# Patient Record
Sex: FEMALE | Race: WHITE | URBAN - METROPOLITAN AREA
[De-identification: names, ages, dates, MRNs, and addresses within clinical notes are randomized per-mention and may not be internally consistent; named-entity substitution may affect disease eponyms.]

---

## 2018-03-12 PROBLEM — C43.72 MALIGNANT MELANOMA OF LEFT LOWER EXTREMITY INCLUDING HIP (HCC): Status: ACTIVE | Noted: 2017-12-07

## 2019-01-16 PROBLEM — Z34.90 PREGNANCY: Status: ACTIVE | Noted: 2019-01-16

## 2019-01-16 PROBLEM — O43.129 VELAMENTOUS INSERTION OF UMBILICAL CORD: Status: ACTIVE | Noted: 2019-01-16

## 2019-01-16 PROBLEM — Z98.891 H/O CESAREAN SECTION: Status: ACTIVE | Noted: 2019-01-16

## 2019-01-16 PROBLEM — F12.91 HISTORY OF MARIJUANA USE: Status: ACTIVE | Noted: 2019-01-16

## 2019-04-10 ENCOUNTER — HOSPITAL ENCOUNTER (OUTPATIENT)
Age: 29
Setting detail: OBSERVATION
Discharge: HOME | End: 2019-04-12
Attending: OBSTETRICS & GYNECOLOGY | Admitting: OBSTETRICS & GYNECOLOGY
Payer: COMMERCIAL

## 2019-04-10 PROBLEM — K81.9 CHOLECYSTITIS, UNSPECIFIED: Status: ACTIVE | Noted: 2019-04-10

## 2019-04-10 LAB
ALBUMIN SERPL-MCNC: 3.1 G/DL (ref 3.5–5)
ALBUMIN/GLOB SERPL: 0.9 {RATIO}
ALP SERPL-CCNC: 206 U/L (ref 50–130)
ALT SERPL-CCNC: 130 U/L (ref 12–65)
ANION GAP SERPL CALC-SCNC: 7 MMOL/L
AST SERPL-CCNC: 106 U/L (ref 15–37)
BASOPHILS # BLD: 0 K/UL (ref 0–0.2)
BASOPHILS NFR BLD: 0 % (ref 0–2)
BILIRUB SERPL-MCNC: 1.5 MG/DL (ref 0.2–1.1)
BUN SERPL-MCNC: 4 MG/DL (ref 6–23)
CALCIUM SERPL-MCNC: 8.6 MG/DL (ref 8.3–10.4)
CHLORIDE SERPL-SCNC: 105 MMOL/L (ref 98–107)
CO2 SERPL-SCNC: 26 MMOL/L (ref 21–32)
CREAT SERPL-MCNC: 0.39 MG/DL (ref 0.6–1)
DIFFERENTIAL METHOD BLD: ABNORMAL
EOSINOPHIL # BLD: 0 K/UL (ref 0–0.8)
EOSINOPHIL NFR BLD: 0 % (ref 0.5–7.8)
ERYTHROCYTE [DISTWIDTH] IN BLOOD BY AUTOMATED COUNT: 13.1 % (ref 11.9–14.6)
GLOBULIN SER CALC-MCNC: 3.6 G/DL (ref 2.3–3.5)
GLUCOSE SERPL-MCNC: 136 MG/DL (ref 65–100)
HCT VFR BLD AUTO: 37.5 % (ref 35.8–46.3)
HGB BLD-MCNC: 12.9 G/DL (ref 11.7–15.4)
IMM GRANULOCYTES # BLD AUTO: 0.1 K/UL (ref 0–0.5)
IMM GRANULOCYTES NFR BLD AUTO: 0 % (ref 0–5)
LYMPHOCYTES # BLD: 1 K/UL (ref 0.5–4.6)
LYMPHOCYTES NFR BLD: 8 % (ref 13–44)
MCH RBC QN AUTO: 29.9 PG (ref 26.1–32.9)
MCHC RBC AUTO-ENTMCNC: 34.4 G/DL (ref 31.4–35)
MCV RBC AUTO: 86.8 FL (ref 79.6–97.8)
MONOCYTES # BLD: 0.5 K/UL (ref 0.1–1.3)
MONOCYTES NFR BLD: 4 % (ref 4–12)
NEUTS SEG # BLD: 10.3 K/UL (ref 1.7–8.2)
NEUTS SEG NFR BLD: 87 % (ref 43–78)
NRBC # BLD: 0 K/UL (ref 0–0.2)
PLATELET # BLD AUTO: 229 K/UL (ref 150–450)
PMV BLD AUTO: 11.3 FL (ref 9.4–12.3)
POTASSIUM SERPL-SCNC: 3 MMOL/L (ref 3.5–5.1)
PROT SERPL-MCNC: 6.7 G/DL
RBC # BLD AUTO: 4.32 M/UL (ref 4.05–5.2)
SODIUM SERPL-SCNC: 138 MMOL/L (ref 136–145)
WBC # BLD AUTO: 11.9 K/UL (ref 4.3–11.1)

## 2019-04-10 PROCEDURE — 96376 TX/PRO/DX INJ SAME DRUG ADON: CPT

## 2019-04-10 PROCEDURE — 96361 HYDRATE IV INFUSION ADD-ON: CPT

## 2019-04-10 PROCEDURE — 96374 THER/PROPH/DIAG INJ IV PUSH: CPT

## 2019-04-10 PROCEDURE — 80053 COMPREHEN METABOLIC PANEL: CPT

## 2019-04-10 PROCEDURE — 85025 COMPLETE CBC W/AUTO DIFF WBC: CPT

## 2019-04-10 PROCEDURE — 36415 COLL VENOUS BLD VENIPUNCTURE: CPT

## 2019-04-10 PROCEDURE — 74011250636 HC RX REV CODE- 250/636: Performed by: OBSTETRICS & GYNECOLOGY

## 2019-04-10 PROCEDURE — 99218 HC RM OBSERVATION: CPT

## 2019-04-10 PROCEDURE — 96375 TX/PRO/DX INJ NEW DRUG ADDON: CPT

## 2019-04-10 RX ORDER — SODIUM CHLORIDE 0.9 % (FLUSH) 0.9 %
5-40 SYRINGE (ML) INJECTION EVERY 8 HOURS
Status: DISCONTINUED | OUTPATIENT
Start: 2019-04-10 | End: 2019-04-12 | Stop reason: HOSPADM

## 2019-04-10 RX ORDER — HYDROMORPHONE HYDROCHLORIDE 2 MG/ML
1 INJECTION, SOLUTION INTRAMUSCULAR; INTRAVENOUS; SUBCUTANEOUS
Status: DISCONTINUED | OUTPATIENT
Start: 2019-04-10 | End: 2019-04-10

## 2019-04-10 RX ORDER — ZOLPIDEM TARTRATE 5 MG/1
5 TABLET ORAL
Status: DISCONTINUED | OUTPATIENT
Start: 2019-04-10 | End: 2019-04-12 | Stop reason: HOSPADM

## 2019-04-10 RX ORDER — SODIUM CHLORIDE 0.9 % (FLUSH) 0.9 %
5-40 SYRINGE (ML) INJECTION AS NEEDED
Status: DISCONTINUED | OUTPATIENT
Start: 2019-04-10 | End: 2019-04-12 | Stop reason: HOSPADM

## 2019-04-10 RX ORDER — HYDROMORPHONE HYDROCHLORIDE 2 MG/ML
1 INJECTION, SOLUTION INTRAMUSCULAR; INTRAVENOUS; SUBCUTANEOUS
Status: DISCONTINUED | OUTPATIENT
Start: 2019-04-10 | End: 2019-04-12 | Stop reason: HOSPADM

## 2019-04-10 RX ORDER — ONDANSETRON 2 MG/ML
4 INJECTION INTRAMUSCULAR; INTRAVENOUS
Status: DISCONTINUED | OUTPATIENT
Start: 2019-04-10 | End: 2019-04-12 | Stop reason: HOSPADM

## 2019-04-10 RX ORDER — ONDANSETRON 2 MG/ML
4 INJECTION INTRAMUSCULAR; INTRAVENOUS
Status: DISCONTINUED | OUTPATIENT
Start: 2019-04-10 | End: 2019-04-10 | Stop reason: SDUPTHER

## 2019-04-10 RX ORDER — DEXTROSE, SODIUM CHLORIDE, SODIUM LACTATE, POTASSIUM CHLORIDE, AND CALCIUM CHLORIDE 5; .6; .31; .03; .02 G/100ML; G/100ML; G/100ML; G/100ML; G/100ML
125 INJECTION, SOLUTION INTRAVENOUS CONTINUOUS
Status: DISCONTINUED | OUTPATIENT
Start: 2019-04-10 | End: 2019-04-11 | Stop reason: ALTCHOICE

## 2019-04-10 RX ADMIN — HYDROMORPHONE HYDROCHLORIDE 1 MG: 2 INJECTION, SOLUTION INTRAMUSCULAR; INTRAVENOUS; SUBCUTANEOUS at 19:00

## 2019-04-10 RX ADMIN — ONDANSETRON 4 MG: 2 INJECTION INTRAMUSCULAR; INTRAVENOUS at 19:00

## 2019-04-10 RX ADMIN — SODIUM CHLORIDE, SODIUM LACTATE, POTASSIUM CHLORIDE, CALCIUM CHLORIDE, AND DEXTROSE MONOHYDRATE 125 ML/HR: 600; 310; 30; 20; 5 INJECTION, SOLUTION INTRAVENOUS at 18:53

## 2019-04-10 RX ADMIN — SODIUM CHLORIDE, SODIUM LACTATE, POTASSIUM CHLORIDE, AND CALCIUM CHLORIDE 1000 ML: 600; 310; 30; 20 INJECTION, SOLUTION INTRAVENOUS at 20:30

## 2019-04-10 RX ADMIN — HYDROMORPHONE HYDROCHLORIDE 1 MG: 2 INJECTION, SOLUTION INTRAMUSCULAR; INTRAVENOUS; SUBCUTANEOUS at 23:41

## 2019-04-10 NOTE — PROGRESS NOTES
DR Louis Denise has been informed that the pt is here and that she was seen in the ED in [facility] Monday. It was decided that she was having a galbladder attack and they consulted with Dr Mehrdad Garcia. She was sent home on medication. She has presented again for N/V and increased pain.

## 2019-04-10 NOTE — H&P
Chief Complaint Patient presents with  Abdominal Pain  
    
 
 
25 y.o. female  at 21w1d  
weeks gestation who is seen for nausea and vomiting. Has been having problems for weeks, and was seen at [facility] ed Monday and diagnosed with cholecystitis. Started on oral abx and fu arranged with general surgeon tomorrow. Did better until Tuesday night when when the vomiting began again and cannot keep anything down. Does not have objective fever but has felt \"hot and sweaty. \" Fetal movement has beennormal . HISTORY: 
OB History  Para Term  AB Living 2 1 1     1 SAB TAB Ectopic Molar Multiple Live Births 0 1 # Outcome Date GA Lbr Andres/2nd Weight Sex Delivery Anes PTL Lv  
2 Current 1 Term 16 40w3d  2.85 kg M CS-LTranv EPIDURAL AN N SHUBHAM Complications: Fetal Intolerance Social History Substance and Sexual Activity Sexual Activity Yes  Partners: Male  Birth control/protection: None Patient's last menstrual period was 2018 (exact date). Social History Socioeconomic History  Marital status:  Spouse name: Not on file  Number of children: Not on file  Years of education: Not on file  Highest education level: Not on file Occupational History  Not on file Social Needs  Financial resource strain: Not on file  Food insecurity:  
  Worry: Not on file Inability: Not on file  Transportation needs:  
  Medical: Not on file Non-medical: Not on file Tobacco Use  Smoking status: Never Smoker  Smokeless tobacco: Never Used Substance and Sexual Activity  Alcohol use: No  
  Alcohol/week: 0.0 oz Frequency: Never Comment: none since +pt  Drug use: Yes Types: Marijuana Comment: Smoked marijuana about 2 months ago. Not a heavy smoker  Sexual activity: Yes  
  Partners: Male Birth control/protection: None Lifestyle  Physical activity: Days per week: Not on file Minutes per session: Not on file  Stress: Not on file Relationships  Social connections:  
  Talks on phone: Not on file Gets together: Not on file Attends Mosque service: Not on file Active member of club or organization: Not on file Attends meetings of clubs or organizations: Not on file Relationship status: Not on file  Intimate partner violence:  
  Fear of current or ex partner: Not on file Emotionally abused: Not on file Physically abused: Not on file Forced sexual activity: Not on file Other Topics Concern  Not on file Social History Narrative  Not on file Past Surgical History:  
Procedure Laterality Date  HX  SECTION    
 x1  
 HX HEENT    
 wisdom teeth  HX LYMPH NODE DISSECTION    
 groin  HX OTHER SURGICAL    
 melanoma removed left hip Past Medical History:  
Diagnosis Date  Acute cholecystitis  Bowel trouble  Gallstones  Melanoma ([facility] [address]   
 left hip ROS: 
Negative: 
 negative 10 point ROS except as noted in HPI Positive: 
 per hpi PHYSICAL EXAM: 
Blood pressure 117/65, pulse 73, temperature 97.1 °F (36.2 °C), resp. rate 20, last menstrual period 2018, not currently breastfeeding. The patient appears well, alert, oriented x 3. Appropriate affect. Lungs are clear. Heart RRR, no murmurs. Abdomen soft, tender to palpation in ruq. Fundus soft and non tender Skin warm, dry, no rashes Ext no edema, DTR's normal 
 
Cervix: Dilation: deferred Fetal Heart Rate: doppler 
 
[place] us results:  
Multiple gall bladder stones and wall thickening.(report on chart) Wbc 14k, mildly elevetated lfts. I have personally reviewed the patient's history, prenatal record, and pertinent test results. Assessment: 
25 y.o. female at 18w1d with symptomatic gall bladder disease. Plan: 
Ivf, pain meds, antiemetics. Recheck labs for comparison. Signed By:  Park Veronica MD   
 April 10, 2019 Addendum: 
 
Patient feels better after dilaudid and zofran Lab Results Component Value Date/Time WBC 11.9 (H) 04/10/2019 06:55 PM  
 Hemoglobin (POC) 12.6 03/12/2018 02:51 PM  
 HGB 12.9 04/10/2019 06:55 PM  
 HCT 37.5 04/10/2019 06:55 PM  
 PLATELET 529 32/17/4540 06:55 PM  
 MCV 86.8 04/10/2019 06:55 PM  
 Hgb, External 13.3 01/16/2019 Hct, External 38.5 01/16/2019 Platelet cnt., External 226 01/16/2019 Lab Results Component Value Date/Time Sodium 138 04/10/2019 07:15 PM  
 Potassium 3.0 (L) 04/10/2019 07:15 PM  
 Chloride 105 04/10/2019 07:15 PM  
 CO2 26 04/10/2019 07:15 PM  
 Anion gap 7 04/10/2019 07:15 PM  
 Glucose 136 (H) 04/10/2019 07:15 PM  
 BUN 4 (L) 04/10/2019 07:15 PM  
 Creatinine 0.39 (L) 04/10/2019 07:15 PM  
 GFR est AA >60 04/10/2019 07:15 PM  
 GFR est non-AA >60 04/10/2019 07:15 PM  
 Calcium 8.6 04/10/2019 07:15 PM  
 Bilirubin, total 1.5 (H) 04/10/2019 07:15 PM  
 AST (SGOT) 106 (H) 04/10/2019 07:15 PM  
 Alk. phosphatase 206 (H) 04/10/2019 07:15 PM  
 Protein, total 6.7 04/10/2019 07:15 PM  
 Albumin 3.1 (L) 04/10/2019 07:15 PM  
 Globulin 3.6 (H) 04/10/2019 07:15 PM  
 A-G Ratio 0.9 04/10/2019 07:15 PM  
 ALT (SGPT) 130 (H) 04/10/2019 07:15 PM  
 
White count has decreased, but lft's have doubled. Patient states she wants surgery. Will keep npo after midnight and recheck labs in am. Patient advised that general surgery would be consulted in the am, but that they may or may not recommend surgery. She understands. Heidi Cevallos MD

## 2019-04-11 ENCOUNTER — APPOINTMENT (OUTPATIENT)
Dept: MRI IMAGING | Age: 29
End: 2019-04-11
Attending: NURSE PRACTITIONER
Payer: COMMERCIAL

## 2019-04-11 LAB
ALBUMIN SERPL-MCNC: 2.6 G/DL (ref 3.5–5)
ALBUMIN/GLOB SERPL: 0.7 {RATIO}
ALP SERPL-CCNC: 170 U/L (ref 50–136)
ALT SERPL-CCNC: 113 U/L (ref 12–65)
ANION GAP SERPL CALC-SCNC: 7 MMOL/L
AST SERPL-CCNC: 72 U/L (ref 15–37)
BASOPHILS # BLD: 0 K/UL (ref 0–0.2)
BASOPHILS NFR BLD: 0 % (ref 0–2)
BILIRUB SERPL-MCNC: 0.4 MG/DL (ref 0.2–1.1)
BUN SERPL-MCNC: 2 MG/DL (ref 6–23)
CALCIUM SERPL-MCNC: 8.2 MG/DL (ref 8.3–10.4)
CHLORIDE SERPL-SCNC: 107 MMOL/L (ref 98–107)
CO2 SERPL-SCNC: 26 MMOL/L (ref 21–32)
CREAT SERPL-MCNC: 0.33 MG/DL (ref 0.6–1)
DIFFERENTIAL METHOD BLD: ABNORMAL
EOSINOPHIL # BLD: 0.1 K/UL (ref 0–0.8)
EOSINOPHIL NFR BLD: 1 % (ref 0.5–7.8)
ERYTHROCYTE [DISTWIDTH] IN BLOOD BY AUTOMATED COUNT: 13.2 % (ref 11.9–14.6)
GLOBULIN SER CALC-MCNC: 3.5 G/DL (ref 2.3–3.5)
GLUCOSE BLD STRIP.AUTO-MCNC: 94 MG/DL (ref 65–100)
GLUCOSE SERPL-MCNC: 88 MG/DL (ref 65–100)
HCT VFR BLD AUTO: 31 % (ref 35.8–46.3)
HGB BLD-MCNC: 10.6 G/DL (ref 11.7–15.4)
IMM GRANULOCYTES # BLD AUTO: 0 K/UL (ref 0–0.5)
IMM GRANULOCYTES NFR BLD AUTO: 0 % (ref 0–5)
LYMPHOCYTES # BLD: 1.9 K/UL (ref 0.5–4.6)
LYMPHOCYTES NFR BLD: 21 % (ref 13–44)
MCH RBC QN AUTO: 29.9 PG (ref 26.1–32.9)
MCHC RBC AUTO-ENTMCNC: 34.2 G/DL (ref 31.4–35)
MCV RBC AUTO: 87.6 FL (ref 79.6–97.8)
MONOCYTES # BLD: 0.7 K/UL (ref 0.1–1.3)
MONOCYTES NFR BLD: 8 % (ref 4–12)
NEUTS SEG # BLD: 6.1 K/UL (ref 1.7–8.2)
NEUTS SEG NFR BLD: 70 % (ref 43–78)
NRBC # BLD: 0 K/UL (ref 0–0.2)
PLATELET # BLD AUTO: 183 K/UL (ref 150–450)
PMV BLD AUTO: 11.4 FL (ref 9.4–12.3)
POTASSIUM SERPL-SCNC: 3.2 MMOL/L (ref 3.5–5.1)
PROT SERPL-MCNC: 6.1 G/DL
RBC # BLD AUTO: 3.54 M/UL (ref 4.05–5.2)
SODIUM SERPL-SCNC: 140 MMOL/L (ref 136–145)
WBC # BLD AUTO: 8.7 K/UL (ref 4.3–11.1)

## 2019-04-11 PROCEDURE — 36415 COLL VENOUS BLD VENIPUNCTURE: CPT

## 2019-04-11 PROCEDURE — 85025 COMPLETE CBC W/AUTO DIFF WBC: CPT

## 2019-04-11 PROCEDURE — 80053 COMPREHEN METABOLIC PANEL: CPT

## 2019-04-11 PROCEDURE — 74011250636 HC RX REV CODE- 250/636: Performed by: OBSTETRICS & GYNECOLOGY

## 2019-04-11 PROCEDURE — 96361 HYDRATE IV INFUSION ADD-ON: CPT

## 2019-04-11 PROCEDURE — 82962 GLUCOSE BLOOD TEST: CPT

## 2019-04-11 PROCEDURE — 74181 MRI ABDOMEN W/O CONTRAST: CPT

## 2019-04-11 PROCEDURE — 99218 HC RM OBSERVATION: CPT

## 2019-04-11 RX ADMIN — SODIUM CHLORIDE, SODIUM LACTATE, POTASSIUM CHLORIDE, CALCIUM CHLORIDE, AND DEXTROSE MONOHYDRATE 125 ML/HR: 600; 310; 30; 20; 5 INJECTION, SOLUTION INTRAVENOUS at 14:05

## 2019-04-11 NOTE — PROGRESS NOTES
Shift assessment complete; FHTs per doppler. Pt resting in bed; denies pain at this time; will continue to monitor.

## 2019-04-11 NOTE — PROGRESS NOTES
MRCP as shown below rules out cholecystitis or choledocholithiasis. I do think biliary colic related to gallstones is playing a significant role in patient's symptoms of right upper quadrant pain and nausea. Pt is feeling better now and has no pain. I would recommend trial of eating and discharge home. My office will work on scheduling outpatient cholecystectomy next week. MRI Results (most recent): 
Results from Hospital Encounter encounter on 04/10/19 MRI ABD WO CONT Narrative MRI of the Abdomen INDICATION: Pregnant, right upper quadrant pain and nausea TECHNIQUE:   
Routine axial and coronal MR sequences were obtained through the abdomen. MRCP 
sequences were included. FINDINGS:  
The common bile duct measures 7 mm diameter. There are no filling defects. Pancreatic duct is normal in caliber. Gallbladder is collapsed around multiple 
gallstones. There is no gallbladder or wall thickening or pericholecystic 
edema. There are no focal masses in the liver, spleen, or pancreas. The kidneys and 
adrenal glands are also unremarkable. There is no hydronephrosis. The appendix 
is normal in size. There are no inflammatory changes or fluid collections in 
the abdomen. There is an intrauterine pregnancy. Since is grossly normal. 
  
 Impression IMPRESSION:  Multiple gallstones. No MR evidence of acute cholecystitis or bile 
duct obstruction.

## 2019-04-11 NOTE — PROGRESS NOTES
Pt complained of returning pain and pressure in right upper outer quadrant and between lower rib cage. Rates pain as 4-5/10. Pt given dilaudid 1mg ivp slowly for pain. siderails up on both sides and instructed not to get out of bed without assistance.

## 2019-04-11 NOTE — H&P (VIEW-ONLY)
H&P/Consult Note/Progress Note/Office Note:  
Ailyn Maynard  MRN: [id number]  LINDA:  Age:28 y.o. 
 
HPI: Ailyn Maynard is a 25 y.o. female who we are asked to see by Dr. Brenda Melo for concerns for acute cholecystitis. She is currently  at 21w1d. The patient reports she has been having intermittent symptoms of nausea and vomiting since 12 weeks gestation. She was seen 19 at [facility] with similar complaints to include RUQ pain. The patient reports a feeling of hopelessness regarding this condition and has tried antacids and antinausea medicine without an improvement in symptoms. She had a U/S at [facility] which showed cholelithiasis and a thick gallbladder wall. She was discharged to follow up with our office. She was unable to make it to the appointment due to nausea, vomiting, and RUQ pain so she came to the ER and was admitted by the [facility] service. The patient is very insistent that she wants her gallbladder removed. WBC 8.7, LFTs are improved from admission:  tbili 1.5---> 0.4, --->113, -->72, -->170. She has not been on any antibiotics. She has not had pain medicine in the past 12 hours. She denies fever but reports chills. She is AF on exam with VSS. She has a negative suggs's sign. Past Medical History:  
Diagnosis Date  Acute cholecystitis  Bowel trouble  Gallstones  Melanoma ([address]   
 left hip Past Surgical History:  
Procedure Laterality Date  HX  SECTION    
 x1  
 HX HEENT    
 wisdom teeth  HX LYMPH NODE DISSECTION    
 groin  HX OTHER SURGICAL    
 melanoma removed left hip Current Facility-Administered Medications Medication Dose Route Frequency  dextrose 5% lactated ringers infusion  125 mL/hr IntraVENous CONTINUOUS  
 sodium chloride (NS) flush 5-40 mL  5-40 mL IntraVENous Q8H  
 sodium chloride (NS) flush 5-40 mL  5-40 mL IntraVENous PRN  
 zolpidem (AMBIEN) tablet 5 mg  5 mg Oral QHS PRN  
  ondansetron (ZOFRAN) injection 4 mg  4 mg IntraVENous Q8H PRN  
 HYDROmorphone (PF) (DILAUDID) injection 1 mg  1 mg IntraVENous Q4H PRN Patient has no known allergies. Social History Socioeconomic History  Marital status:  Spouse name: Not on file  Number of children: Not on file  Years of education: Not on file  Highest education level: Not on file Tobacco Use  Smoking status: Never Smoker  Smokeless tobacco: Never Used Substance and Sexual Activity  Alcohol use: No  
  Alcohol/week: 0.0 oz Frequency: Never Comment: none since +pt  Drug use: Yes Types: Marijuana Comment: Smoked marijuana about 2 months ago. Not a heavy smoker  Sexual activity: Yes  
  Partners: Male Birth control/protection: None Social History Tobacco Use Smoking Status Never Smoker Smokeless Tobacco Never Used Family History Problem Relation Age of Onset  Ovarian Cancer Mother  Alcohol abuse Mother  Depression Mother  Bipolar Disorder Mother  Alcohol abuse Father  Depression Father  Suicide Father  Diabetes Maternal Grandmother  COPD Maternal Grandmother  Atrial Fibrillation Maternal Grandmother  Diabetes Paternal Grandmother  Cancer Paternal Grandfather   
     throat ROS: The patient has no difficulty with chest pain or shortness of breath. No fever or chills. Comprehensive review of systems was otherwise unremarkable except as noted above. Physical Exam:  
Visit Vitals /71 (BP 1 Location: Left arm) Pulse 77 Temp 99.2 °F (37.3 °C) Resp 16 LMP 11/13/2018 (Exact Date) Constitutional: Alert, oriented, cooperative patient in no acute distress; appears stated age Eyes:Sclera are clear. EOMs intact ENMT: no external lesions gross hearing normal; no obvious neck masses, no ear or lip lesions, nares normal 
CV: RRR. Normal perfusion Resp: No JVD. Breathing is  non-labored; no audible wheezing. GI: soft and non-distended, \"sore\" in RUQ, no rebound, no guarding, neg suggs's Musculoskeletal: unremarkable with normal function. No embolic signs or cyanosis. Neuro:  Oriented; moves all 4; no focal deficits Psychiatric: normal affect and mood, no memory impairment Recent vitals (if inpt): 
Patient Vitals for the past 24 hrs: 
 BP Temp Pulse Resp  
04/11/19 0800 122/71 99.2 °F (37.3 °C) 77 16  
04/10/19 2207 121/60 97.5 °F (36.4 °C) 74 18  
04/10/19 2021 113/64  74 18  
04/10/19 1727 117/65 97.1 °F (36.2 °C) 73 20 Labs: 
Recent Labs 04/11/19 
7501 WBC 8.7 HGB 10.6*    
K 3.2*  
 CO2 26 BUN 2*  
CREA 0.33* GLU 88 TBILI 0.4 SGOT 72* * * Lab Results Component Value Date/Time WBC 8.7 04/11/2019 06:14 AM  
 HGB 10.6 (L) 04/11/2019 06:14 AM  
 PLATELET 967 98/37/5065 06:14 AM  
 Sodium 140 04/11/2019 06:14 AM  
 Potassium 3.2 (L) 04/11/2019 06:14 AM  
 Chloride 107 04/11/2019 06:14 AM  
 CO2 26 04/11/2019 06:14 AM  
 BUN 2 (L) 04/11/2019 06:14 AM  
 Creatinine 0.33 (L) 04/11/2019 06:14 AM  
 Glucose 88 04/11/2019 06:14 AM  
 Bilirubin, total 0.4 04/11/2019 06:14 AM  
 AST (SGOT) 72 (H) 04/11/2019 06:14 AM  
 ALT (SGPT) 113 (H) 04/11/2019 06:14 AM  
 Alk. phosphatase 170 (H) 04/11/2019 06:14 AM  
 Hgb, External 13.3 01/16/2019 Platelet cnt., External 226 01/16/2019 I reviewed recent labs and recent radiologic studies. U/S Abd 4/8 at [facility] Interpretation:  
Cholecystitis with visualized stones/sludge. Indication:  
RUQ abdominal pain. A limited sonographic evaluation of the right upper quadrant was performed and interpreted by Ethel Justin MD at 6:45 PM 
 
Findings Gallbladder size:  
Normal. 
Gallstones Present:  
Yes Wall Thickness: Thickened, >3mm. Pericholecystic Fluid: Unable to visualize due to multiple gallstones with shadowing. Common Bile Duct:  
Normal, size < age adjusted cutoff. I independently reviewed radiology images for studies I described above or studies I have ordered. Admission date (for inpatients): 4/10/2019 * No surgery found *  * No surgery found * ASSESSMENT/PLAN: 
Problem List  Date Reviewed: 2019 Codes Class Noted * (Principal) Cholecystitis, unspecified ICD-10-CM: K81.9 ICD-9-CM: 575.10  4/10/2019 Pregnancy ICD-10-CM: Z34.90 ICD-9-CM: V22.2  2019 Overview Addendum 2019  4:05 PM by Amita Bautista RN Genetics- low risk Glucola- 
 
Flu vaccine- 19 pt declines History of marijuana use ICD-10-CM: Z87.898 ICD-9-CM: 305.23  2019 Overview Addendum 2019  9:51 AM by Amita Bautista RN  
  19 pt states that she had 1 THC gummy bear in [place] in Nov. Smoked marijuana 3 years ago. Random UDS 
19 UDS- NEGATIVE 
  
  
   
 H/O  section ICD-10-CM: Z98.891 ICD-9-CM: V45.89  2019 Overview Signed 2019 10:21 AM by Amita Bautista RN  
  Pt considering TOLAC. Aware of TOLAC protocol, her pre-pregnancy BMI is 34.58. Velamentous insertion of umbilical cord DVT-34-AO: O43.129 ICD-9-CM: 663.80  2019 Overview Signed 2019 10:30 AM by Amita Bautista RN With G1 Malignant melanoma of left lower extremity including hip (HCC) ICD-10-CM: C43.72 
ICD-9-CM: 172.7  2017 Overview Signed 3/12/2018  2:57 PM by Mike Lemos MD  
  Overview:  
Added automatically from request for surgery [id number] Gallbladder calculus without cholecystitis ICD-10-CM: K80.20 ICD-9-CM: 574.20  6/15/2016 Principal Problem: 
  Cholecystitis, unspecified (4/10/2019) Recommend MRCP and if positive will consult GI May have clear liquids after MRCP if negative Patient is adament to go to surgery and repeated this multiple times.  She is not eager for other procedures. I explained the necessity of these procedures in detail and she agrees to MRCP. Further input pending eval by Dr. Olman Laboy Signed:  Titi Hinton NP I will see patient once MRCP has been completed.    
 
Wanda Christine MD

## 2019-04-11 NOTE — CONSULTS
H&P/Consult Note/Progress Note/Office Note:   Lauren Mendoza  MRN: [id number]  ILK:  Age:28 y.o.    HPI: Lauren Mendoza is a 25 y.o. female who we are asked to see by Dr. Bhavik French for concerns for acute cholecystitis. She is currently  at 21w1d. The patient reports she has been having intermittent symptoms of nausea and vomiting since 12 weeks gestation. She was seen 19 at [facility] with similar complaints to include RUQ pain. The patient reports a feeling of hopelessness regarding this condition and has tried antacids and antinausea medicine without an improvement in symptoms. She had a U/S at [facility] which showed cholelithiasis and a thick gallbladder wall. She was discharged to follow up with our office. She was unable to make it to the appointment due to nausea, vomiting, and RUQ pain so she came to the ER and was admitted by the [facility] service. The patient is very insistent that she wants her gallbladder removed. WBC 8.7, LFTs are improved from admission:  tbili 1.5---> 0.4, --->113, -->72, -->170. She has not been on any antibiotics. She has not had pain medicine in the past 12 hours. She denies fever but reports chills. She is AF on exam with VSS. She has a negative suggs's sign.      Past Medical History:   Diagnosis Date    Acute cholecystitis     Bowel trouble     Gallstones     Melanoma ([address]     left hip     Past Surgical History:   Procedure Laterality Date    HX  SECTION      x1    HX HEENT      wisdom teeth    HX LYMPH NODE DISSECTION      groin    HX OTHER SURGICAL      melanoma removed left hip     Current Facility-Administered Medications   Medication Dose Route Frequency    dextrose 5% lactated ringers infusion  125 mL/hr IntraVENous CONTINUOUS    sodium chloride (NS) flush 5-40 mL  5-40 mL IntraVENous Q8H    sodium chloride (NS) flush 5-40 mL  5-40 mL IntraVENous PRN    zolpidem (AMBIEN) tablet 5 mg  5 mg Oral QHS PRN    ondansetron (ZOFRAN) injection 4 mg  4 mg IntraVENous Q8H PRN    HYDROmorphone (PF) (DILAUDID) injection 1 mg  1 mg IntraVENous Q4H PRN     Patient has no known allergies. Social History     Socioeconomic History    Marital status:      Spouse name: Not on file    Number of children: Not on file    Years of education: Not on file    Highest education level: Not on file   Tobacco Use    Smoking status: Never Smoker    Smokeless tobacco: Never Used   Substance and Sexual Activity    Alcohol use: No     Alcohol/week: 0.0 oz     Frequency: Never     Comment: none since +pt    Drug use: Yes     Types: Marijuana     Comment: Smoked marijuana about 2 months ago. Not a heavy smoker     Sexual activity: Yes     Partners: Male     Birth control/protection: None     Social History     Tobacco Use   Smoking Status Never Smoker   Smokeless Tobacco Never Used     Family History   Problem Relation Age of Onset    Ovarian Cancer Mother     Alcohol abuse Mother     Depression Mother     Bipolar Disorder Mother     Alcohol abuse Father     Depression Father     Suicide Father     Diabetes Maternal Grandmother     COPD Maternal Grandmother     Atrial Fibrillation Maternal Grandmother     Diabetes Paternal Grandmother     Cancer Paternal Grandfather         throat     ROS: The patient has no difficulty with chest pain or shortness of breath. No fever or chills. Comprehensive review of systems was otherwise unremarkable except as noted above. Physical Exam:   Visit Vitals  /71 (BP 1 Location: Left arm)   Pulse 77   Temp 99.2 °F (37.3 °C)   Resp 16   LMP 11/13/2018 (Exact Date)     Constitutional: Alert, oriented, cooperative patient in no acute distress; appears stated age    Eyes:Sclera are clear. EOMs intact  ENMT: no external lesions gross hearing normal; no obvious neck masses, no ear or lip lesions, nares normal  CV: RRR. Normal perfusion  Resp: No JVD. Breathing is  non-labored; no audible wheezing.     GI: soft and non-distended, \"sore\" in RUQ, no rebound, no guarding, neg suggs's     Musculoskeletal: unremarkable with normal function. No embolic signs or cyanosis. Neuro:  Oriented; moves all 4; no focal deficits  Psychiatric: normal affect and mood, no memory impairment    Recent vitals (if inpt):  Patient Vitals for the past 24 hrs:   BP Temp Pulse Resp   04/11/19 0800 122/71 99.2 °F (37.3 °C) 77 16   04/10/19 2207 121/60 97.5 °F (36.4 °C) 74 18   04/10/19 2021 113/64  74 18   04/10/19 1727 117/65 97.1 °F (36.2 °C) 73 20       Labs:  Recent Labs     04/11/19  0614   WBC 8.7   HGB 10.6*         K 3.2*      CO2 26   BUN 2*   CREA 0.33*   GLU 88   TBILI 0.4   SGOT 72*   *   *       Lab Results   Component Value Date/Time    WBC 8.7 04/11/2019 06:14 AM    HGB 10.6 (L) 04/11/2019 06:14 AM    PLATELET 635 25/89/4081 06:14 AM    Sodium 140 04/11/2019 06:14 AM    Potassium 3.2 (L) 04/11/2019 06:14 AM    Chloride 107 04/11/2019 06:14 AM    CO2 26 04/11/2019 06:14 AM    BUN 2 (L) 04/11/2019 06:14 AM    Creatinine 0.33 (L) 04/11/2019 06:14 AM    Glucose 88 04/11/2019 06:14 AM    Bilirubin, total 0.4 04/11/2019 06:14 AM    AST (SGOT) 72 (H) 04/11/2019 06:14 AM    ALT (SGPT) 113 (H) 04/11/2019 06:14 AM    Alk. phosphatase 170 (H) 04/11/2019 06:14 AM    Hgb, External 13.3 01/16/2019    Platelet cnt., External 226 01/16/2019       I reviewed recent labs and recent radiologic studies. U/S Abd 4/8 at [facility]  Interpretation:   Cholecystitis with visualized stones/sludge. Indication:   RUQ abdominal pain. A limited sonographic evaluation of the right upper quadrant was performed and interpreted by Janey Skinner MD at 6:45 PM    Findings   Gallbladder size:   Normal.  Gallstones Present:   Yes  Wall Thickness: Thickened, >3mm. Pericholecystic Fluid:   Unable to visualize due to multiple gallstones with shadowing. Common Bile Duct:   Normal, size < age adjusted cutoff.     I independently reviewed radiology images for studies I described above or studies I have ordered. Admission date (for inpatients): 4/10/2019   * No surgery found *  * No surgery found *    ASSESSMENT/PLAN:  Problem List  Date Reviewed: 2019          Codes Class Noted    * (Principal) Cholecystitis, unspecified ICD-10-CM: K81.9  ICD-9-CM: 575.10  4/10/2019        Pregnancy ICD-10-CM: Z34.90  ICD-9-CM: V22.2  2019    Overview Addendum 2019  4:05 PM by Joleen Machado RN     Genetics- low risk    Glucola-    Flu vaccine- 19 pt declines             History of marijuana use ICD-10-CM: Z87.898  ICD-9-CM: 305.23  2019    Overview Addendum 2019  9:51 AM by Joleen Machado RN     19 pt states that she had 1 THC gummy bear in [place] in Nov. Smoked marijuana 3 years ago. Random UDS  19 UDS- NEGATIVE             H/O  section ICD-10-CM: Z98.891  ICD-9-CM: V45.89  2019    Overview Signed 2019 10:21 AM by Joleen Machado RN     Pt considering TOLAC. Aware of TOLAC protocol, her pre-pregnancy BMI is 34.58. Velamentous insertion of umbilical cord FGU-03-QZ: O43.129  ICD-9-CM: 663.80  2019    Overview Signed 2019 10:30 AM by Joleen Machado RN     With G1             Malignant melanoma of left lower extremity including hip [facility]) ICD-10-CM: C43.72  ICD-9-CM: 172.7  2017    Overview Signed 3/12/2018  2:57 PM by Veronica Shen MD     Overview:   Added automatically from request for surgery [id number]             Gallbladder calculus without cholecystitis ICD-10-CM: K80.20  ICD-9-CM: 574.20  6/15/2016            Principal Problem:    Cholecystitis, unspecified (4/10/2019)       Recommend MRCP and if positive will consult GI  May have clear liquids after MRCP if negative  Patient is adament to go to surgery and repeated this multiple times. She is not eager for other procedures.  I explained the necessity of these procedures in detail and she agrees to MRCP. Further input pending eval by Dr. Corine Hood    Signed:  Tiffanie Malhotra NP     I will see patient once MRCP has been completed.       Tru Qureshi MD

## 2019-04-11 NOTE — PROGRESS NOTES
Patient sitting up in bed states it feels better to sit up. Rates her pain at a 0 at this time. Patient is very adamant about having surgery, explained to patient that Dr Alfonso Mortensen will be in to see her and discuss her labs and plan of care.

## 2019-04-11 NOTE — PROGRESS NOTES
Received report from Mariama Liang RN. Pt admitted for 23 hour observation to room 437. Pt reports gallstones with severe abdominal pain, nausea and vomitting with significant weight loss over the past several weeks. Pt was scheduled for surgery consult in the A.M[facility] With [facility]. Pt was has iv infusing without any redness or edema. Lungs are clear. Abdomen soft with no tenderness noted. Pt voiding without difficulty and mild edema noted in feet. No clonus detected. Oriented to room. Discussed plan of care including pain management and consult to surgeon in the morning. Pt and  both verbalized understanding.

## 2019-04-11 NOTE — PROGRESS NOTES
Pt back from radiology; Dr. Mary Jane Mederos at bedside to discuss POC. MD to call with results of MRI.

## 2019-04-11 NOTE — PROGRESS NOTES
Discussion with patient and , they are both frustrated, had many questions. Philip Macario is to have an MRCP today, she is NPO. She is desperate to get relief and wants surgery to that end. She understands now that more information is needed before that decision can be made. Appreciate nurse [address] [place] [address] and Dr Marquise Kumari help.

## 2019-04-12 VITALS — WEIGHT: 196 LBS | BODY MASS INDEX: 32.65 KG/M2 | HEIGHT: 65 IN

## 2019-04-12 VITALS
DIASTOLIC BLOOD PRESSURE: 60 MMHG | RESPIRATION RATE: 18 BRPM | SYSTOLIC BLOOD PRESSURE: 108 MMHG | TEMPERATURE: 98.6 F | HEART RATE: 88 BPM

## 2019-04-12 PROCEDURE — 99218 HC RM OBSERVATION: CPT

## 2019-04-12 NOTE — PROGRESS NOTES
Pt discharged to home. Discharge instructions given. Pt verbalized understanding. Stable at discharge.

## 2019-04-12 NOTE — DISCHARGE INSTRUCTIONS
Patient Education        Learning About Acute Cholecystitis  What is cholecystitis? Cholecystitis (say \"koh-lih-sis-TY-tus\") is inflammation of the gallbladder. The gallbladder stores bile. Bile helps the body digest food. Normally, the bile flows from the gallbladder to the small intestine. A gallstone stuck in the cystic duct is most often the cause of sudden (acute) cholecystitis. The cystic duct is the tube that carries the bile out of the gallbladder. The gallstone blocks the bile from leaving the gallbladder. This results in an irritated and swollen gallbladder. The disease can also be caused by infection or trauma, such as an injury from a car accident. Cholecystitis has to be treated right away. You will probably have to go to the hospital. Surgery is the usual treatment. What are the symptoms? Symptoms include:  · Steady and severe pain in the upper right part of belly. This is the most common symptom. The pain can sometimes move to your back or right shoulder blade. It may last for more than 6 hours. · Nausea or vomiting. · A fever. How is it treated? The main way to treat this disease is surgery to remove the gallbladder. This surgery can often be done through small cuts (incisions) in the belly. This is called a laparoscopic cholecystectomy. In some cases, you may need a more extensive surgery. You may need surgery as soon as possible. The doctor may try to reduce swelling and irritation in the gallbladder before removing it. You may be given fluids and antibiotics through an IV. You may also be given pain medicine. Follow-up care is a key part of your treatment and safety. Be sure to make and go to all appointments, and call your doctor if you are having problems. It's also a good idea to know your test results and keep a list of the medicines you take. Where can you learn more? Go to http://tal-pradeep.info/.   Enter T940 in the search box to learn more about \"Learning About Acute Cholecystitis. \"  Current as of: 2018  Content Version: 11.9  © 5713-0120 [employer]. Care instructions adapted under license by [employer] (which disclaims liability or warranty for this information). If you have questions about a medical condition or this instruction, always ask your healthcare professional. [address] any warranty or liability for your use of this information. Patient Education        Pregnancy Precautions: Care Instructions  Your Care Instructions    There is no sure way to prevent labor before your due date ( labor) or to prevent most other pregnancy problems. But there are things you can do to increase your chances of a healthy pregnancy. Go to your appointments, follow your doctor's advice, and take good care of yourself. Eat well, and exercise (if your doctor agrees). And make sure to drink plenty of water. Follow-up care is a key part of your treatment and safety. Be sure to make and go to all appointments, and call your doctor if you are having problems. It's also a good idea to know your test results and keep a list of the medicines you take. How can you care for yourself at home? · Make sure you go to your prenatal appointments. At each visit, your doctor will check your blood pressure. Your doctor will also check to see if you have protein in your urine. High blood pressure and protein in urine are signs of preeclampsia. This condition can be dangerous for you and your baby. · Drink plenty of fluids, enough so that your urine is light yellow or clear like water. Dehydration can cause contractions. If you have kidney, heart, or liver disease and have to limit fluids, talk with your doctor before you increase the amount of fluids you drink. · Tell your doctor right away if you notice any symptoms of an infection, such as:  ? Burning when you urinate.   ? A foul-smelling discharge from your vagina. ? Vaginal itching. ? Unexplained fever. ? Unusual pain or soreness in your uterus or lower belly. · Eat a balanced diet. Include plenty of foods that are high in calcium and iron. ? Foods high in calcium include milk, cheese, yogurt, almonds, and broccoli. ? Foods high in iron include red meat, shellfish, poultry, eggs, beans, raisins, whole-grain bread, and leafy green vegetables. · Do not smoke. If you need help quitting, talk to your doctor about stop-smoking programs and medicines. These can increase your chances of quitting for good. · Do not drink alcohol or use illegal drugs. · Follow your doctor's directions about activity. Your doctor will let you know how much, if any, exercise you can do. · Ask your doctor if you can have sex. If you are at risk for early labor, your doctor may ask you to not have sex. · Take care to prevent falls. During pregnancy, your joints are loose, and your balance is off. Sports such as bicycling, skiing, or in-line skating can increase your risk of falling. And don't ride horses or motorcycles, dive, water ski, scuba dive, or parachute jump while you are pregnant. · Avoid getting very hot. Do not use saunas or hot tubs. Avoid staying out in the sun in hot weather for long periods. Take acetaminophen (Tylenol) to lower a high fever. · Do not take any over-the-counter or herbal medicines or supplements without talking to your doctor or pharmacist first.  When should you call for help? Call 911 anytime you think you may need emergency care. For example, call if:    · You passed out (lost consciousness).     · You have severe vaginal bleeding.     · You have severe pain in your belly or pelvis.     · You have had fluid gushing or leaking from your vagina and you know or think the umbilical cord is bulging into your vagina. If this happens, immediately get down on your knees so your rear end (buttocks) is higher than your head.  This will decrease the pressure on the cord until help arrives.   [facility] your doctor now or seek immediate medical care if:    · You have signs of preeclampsia, such as:  ? Sudden swelling of your face, hands, or feet. ? New vision problems (such as dimness or blurring). ? A severe headache.     · You have any vaginal bleeding.     · You have belly pain or cramping.     · You have a fever.     · You have had regular contractions (with or without pain) for an hour. This means that you have 8 or more within 1 hour or 4 or more in 20 minutes after you change your position and drink fluids.     · You have a sudden release of fluid from your vagina.     · You have low back pain or pelvic pressure that does not go away.     · You notice that your baby has stopped moving or is moving much less than normal.    Watch closely for changes in your health, and be sure to contact your doctor if you have any problems. Where can you learn more? Go to http://tal-pradeep.info/. Enter [id number] in the search box to learn more about \"Pregnancy Precautions: Care Instructions. \"  Current as of: September 5, 2018  Content Version: 11.9  © 0492-5417 [employer]. Care instructions adapted under license by [employer] (which disclaims liability or warranty for this information). If you have questions about a medical condition or this instruction, always ask your healthcare professional. [address] any warranty or liability for your use of this information.

## 2019-04-12 NOTE — PERIOP NOTES
Patient verified name and . Order for consent not found in EHR. Type 2 surgery, PAT phone assessment complete. Orders not received. Labs per surgeon: no orders. Labs per anesthesia protocol: Hgb- 10.6 on 19 and within anesthesia guidelines. Sykevin Esparza in pre-op notified that pt is 21 weeks pregnant. Patient answered medical/surgical history questions at their best of ability. All prior to admission medications documented in [facility]. Patient instructed to take the following medications the day of surgery according to anesthesia guidelines with a small sip of water: nexium. Hold all vitamins 7 days prior to surgery and NSAIDS 5 days prior to surgery. Prescription meds to hold:none. Patient instructed on the following:  Arrive at A Entrance, time of arrival to be called the day before by 1700  NPO after midnight including gum, mints, and ice chips  Responsible adult must drive patient to the hospital, stay during surgery, and patient will need supervision 24 hours after anesthesia  Use antibacterial soap in shower the night before surgery and on the morning of surgery  All piercings must be removed prior to arrival.    Leave all valuables (money and jewelry) at home but bring insurance card and ID on  DOS. Do not wear make-up, nail polish, lotions, cologne, perfumes, powders, or oil on skin. Patient teach back successful and patient demonstrates knowledge of instruction.

## 2019-04-12 NOTE — PROGRESS NOTES
Call placed to Dr. Sal Mckeon due to patient complaining of diarrhea and heartburn after eating 1.5 hours ago. Dr. Sal Mckeon states the patient can stay overnight, D/C IV fluids.

## 2019-04-12 NOTE — DISCHARGE SUMMARY
[address] [place] [address] Discharge Summary     Patient ID:  Nathanael Whitman  [id number]  55 y.o.  1990    Admit date: 4/10/2019    Discharge date and time: No discharge date for patient encounter. Admitting Physician: Williams Kim MD     Discharge Physician: Carmen Newell M.D. Admission Diagnoses: Cholecystitis, unspecified [K81.9]; Cholecystitis, unspecified [K81.9]    Problem List: Hospital - Principal Problem:    Cholecystitis, unspecified (4/10/2019)     ; Other -   Patient Active Problem List   Diagnosis Code    Gallbladder calculus without cholecystitis K80.20    Malignant melanoma of left lower extremity including hip (HCC) C43.72    Pregnancy Z34.90    History of marijuana use Z87.898    H/O  section Z98.891    Velamentous insertion of umbilical cord V47.640    Cholecystitis, unspecified K81.9        Discharge Diagnoses: Cholecystitis, unspecified [K81.9]; Cholecystitis, unspecified [K81.9]    Hospital Course: Hospital course complicated by acute cholecystitis    Significant Diagnostic Studies:   Recent Results (from the past 24 hour(s))   GLUCOSE, POC    Collection Time: 19 10:57 AM   Result Value Ref Range    Glucose (POC) 94 65 - 100 mg/dL       Procedures: MRCP    Discharge Exam:  Visit Vitals  /64 (BP 1 Location: Left arm, BP Patient Position: At rest)   Pulse 81   Temp 98.2 °F (36.8 °C)   Resp 16   LMP 2018 (Exact Date)        Heart: regular rate and rhythm, S1, S2 normal, no murmur, click, rub or gallop  Lungs:clear to auscultation bilaterally  Extremities: normal, atraumatic, no cyanosis or edema. No DVT    Patient Instructions:   Current Discharge Medication List      CONTINUE these medications which have NOT CHANGED    Details   metoclopramide HCl (REGLAN) 5 mg tablet Take 1 Tab by mouth four (4) times daily for 10 days. Indications: gastroesophageal reflux disease  Qty: 90 Tab, Refills: 3      esomeprazole (NEXIUM) 40 mg capsule Take 1 Cap by mouth daily.   Qty: 30 Cap, Refills: 6      omeprazole (PRILOSEC) 20 mg capsule Take 20 mg by mouth daily. Lactobacillus acidophilus (PROBIOTIC PO) Take  by mouth. docusate sodium (STOOL SOFTENER PO) Take  by mouth. PNV COMBO#47/IRON/FA #1/DHA (PNV-DHA PO) Take  by mouth.             Activity: physical activity is restricted per discharge instructions  Diet: resume normal diet  Wound Care: Keep wound clean and dry, as directed    Follow-up with Fredi  in 2 -3 weeks and Dr Larissa Gonzalez next week    Signed:  Cori Harrington MD  4/12/2019  9:03 AM

## 2019-04-15 ENCOUNTER — HOSPITAL ENCOUNTER (OUTPATIENT)
Dept: SURGERY | Age: 29
Discharge: HOME | End: 2019-04-15

## 2019-04-15 RX ORDER — SODIUM CHLORIDE 0.9 % (FLUSH) 0.9 %
5-40 SYRINGE (ML) INJECTION EVERY 8 HOURS
Status: CANCELLED | OUTPATIENT
Start: 2019-04-15

## 2019-04-15 RX ORDER — SODIUM CHLORIDE 0.9 % (FLUSH) 0.9 %
5-40 SYRINGE (ML) INJECTION AS NEEDED
Status: CANCELLED | OUTPATIENT
Start: 2019-04-15

## 2019-04-15 NOTE — ADT AUTH CERT NOTES
Facility Name: [address] [place] [address]           
[address] [place] [id number] ENJ:[id number] 
  
Observation Authorization Request for over 23 hours   
  
  
  
   
Patient Demographics Patient Name Pablo Carr Female  
1990 Address [address] [facility] [address] Phone [phone] (Home) [phone] (Mobile) *Preferred* Hospital Account Name Acct ID Class Status Primary Coverage Pablo Nugent [id number] OBSERVATION Discharged/Not Billed BLUE CROSS - [place] BLUE CROSS [facility]  
  
   
Guarantor Account (for Hospital Account [de-identified]) Name Relation to Pt Service Area Active? Acct Type Pablo Nugent Self [address] Yes Personal/Family Address Phone [facility] [address] [phone](C)    
  
   
Coverage Information (for Hospital Account [de-identified]) F/O Payor/Plan Subscriber  Subscriber Sex Precert # BLUE CROSS/[place] BLUE CROSS [facility] 88 M Subscriber Subscriber # Alberta Minus [id number] [facility] # Group Name [id number] Address Phone [address] [facility], [address] [facility] Policy Number Status Effective Date Benefits Phone  
- -  - Auth/Cert BRYAN   
  
   
Admission Information Arrival Date/Time: 04/10/2019 1710 Admit Date/Time: 04/10/2019 1710 IP Adm. Date/Time:   
Admission Type: Elective Point of Origin: Clinic Or Physician Office Admit Category:   
Means of Arrival:  Primary Service: Obstetrics Secondary Service: N/A Transfer Source:  Service Area: [facility] Unit: SFE 4 ANTEPARTUM Admit Provider: Mandeep Rosales MD Attending Provider: Cierra Hernandez DO Referring Provider:   
Admission Information Attending Provider Admission Dx Admitted On Cholecystitis, unspecified, Cholecystitis, unspecified 04/10/19 Service Isolation Code Status OBSTETRICS  Prior Allergies Advance Care Planning No Known Allergies Jump to the Activity    
 Admission Information Unit/Bed: [facility] ANTEPARTUM/ Service: OBSTETRICS Admitting provider: Smith Lopez MD Phone: [phone] Attending provider:  Phone: PCP: Hiwot Negro MD Phone: [phone] Admission dx: Jossie Guzman bladder Patient class: V Admission type: EL    
Patient Demographics Patient Name Kayley Roberts [facility] 
[id number] Sex Female  
1990 Address [address] [place] [address] Phone [phone] (Home) [phone] (Mobile) *Preferred* H&P Notes  
 
 H&P by Smith Lopez MD at 04/10/19 1839 documented on Admission (Discharged) from 4/10/2019 in [address] [place] [address] 4 ANTEPARTUM Author: Smith Lopez MD Author Type: Physician Filed: 04/10/19 2139 Note Status: Addendum Cosign: Cosign Not Required Date of Service: 04/10/19 1839 : Smith Lopez MD (Physician) Prior Versions: 1. Smith Lopez MD (Physician) at 04/10/19 4925 - Signed Chief Complaint Patient presents with  Abdominal Pain  
      
  
  
25 y.o. female  at 21w1d  
weeks gestation who is seen for nausea and vomiting. Has been having problems for weeks, and was seen at [facility] ed Monday and diagnosed with cholecystitis. Started on oral abx and fu arranged with general surgeon tomorrow. Did better until Tuesday night when when the vomiting began again and cannot keep anything down. Does not have objective fever but has felt \"hot and sweaty. \"  
  
  
Fetal movement has beennormal . 
  
HISTORY: 
                 
OB History  Para Term  AB Living 2 1 1     1 SAB TAB Ectopic Molar Multiple Live Births 0 1  
   
# Outcome Date GA Lbr Andres/2nd Weight Sex Delivery Anes PTL Lv  
2 Current                    
1 Term 16 40w3d   2.85 kg M CS-LTranv EPIDURAL AN N SHUBHAM Complications: Fetal Intolerance  
  
  
Social History  
  
    
Substance and Sexual Activity Sexual Activity Yes  Partners: Male  Birth control/protection: None  
  
Patient's last menstrual period was 2018 (exact date). 
  
Social History  
  
     
Socioeconomic History  Marital status:   
    Spouse name: Not on file  Number of children: Not on file  Years of education: Not on file  Highest education level: Not on file Occupational History  Not on file Social Needs  Financial resource strain: Not on file  Food insecurity:  
    Worry: Not on file  
    Inability: Not on file  Transportation needs:  
    Medical: Not on file  
    Non-medical: Not on file Tobacco Use  Smoking status: Never Smoker  Smokeless tobacco: Never Used Substance and Sexual Activity  Alcohol use: No  
    Alcohol/week: 0.0 oz  
    Frequency: Never  
    Comment: none since +pt  Drug use: Yes  
    Types: Marijuana  
    Comment: Smoked marijuana about 2 months ago. Not a heavy smoker  Sexual activity: Yes  
    Partners: Male  
    Birth control/protection: None Lifestyle  Physical activity:  
    Days per week: Not on file  
    Minutes per session: Not on file  Stress: Not on file Relationships  Social connections:  
    Talks on phone: Not on file  
    Gets together: Not on file  
    Attends Rastafarian service: Not on file  
    Active member of club or organization: Not on file  
    Attends meetings of clubs or organizations: Not on file  
    Relationship status: Not on file  Intimate partner violence:  
    Fear of current or ex partner: Not on file  
    Emotionally abused: Not on file  
    Physically abused: Not on file  
    Forced sexual activity: Not on file Other Topics Concern  Not on file Social History Narrative  Not on file  
  
  
     
Past Surgical History:  
Procedure Laterality Date  HX  SECTION      
  x1  
 HX HEENT      
  wisdom teeth  HX LYMPH NODE DISSECTION      
  groin  HX OTHER SURGICAL      
  melanoma removed left hip  
  
  
 Past Medical History:  
Diagnosis Date  Acute cholecystitis    
 Bowel trouble    
 Gallstones    
 Melanoma (HCC)    
  left hip  
  
  
  
ROS: 
Negative: 
 negative 10 point ROS except as noted in HPI 
  
Positive: 
 per hpi 
  
PHYSICAL EXAM: 
Blood pressure 117/65, pulse 73, temperature 97.1 °F (36.2 °C), resp. rate 20, last menstrual period 11/13/2018, not currently breastfeeding. 
  
The patient appears well, alert, oriented x 3. Appropriate affect. Lungs are clear. Heart RRR, no murmurs. Abdomen soft, tender to palpation in ruq. Fundus soft and non tender Skin warm, dry, no rashes Ext no edema, DTR's normal 
  
Cervix: Dilation: deferred 
  
Fetal Heart Rate: doppler 
  
oconee us results:  
Multiple gall bladder stones and wall thickening.(report on chart) 
  
Wbc 14k, mildly elevetated lfts.  
  
I have personally reviewed the patient's history, prenatal record, and pertinent test results. 
  
  
Assessment: 
25 y.o. female at 18w1d with symptomatic gall bladder disease.  
  
Plan: 
Ivf, pain meds, antiemetics. Recheck labs for comparison. 
  
Signed By:  Gary Durbin MD   
  April 10, 2019   
  
Addendum: 
  
Patient feels better after dilaudid and zofran 
  
     
Lab Results Component Value Date/Time  
  WBC 11.9 (H) 04/10/2019 06:55 PM  
  Hemoglobin (POC) 12.6 03/12/2018 02:51 PM  
  HGB 12.9 04/10/2019 06:55 PM  
  HCT 37.5 04/10/2019 06:55 PM  
  PLATELET 015 69/45/7327 06:55 PM  
  MCV 86.8 04/10/2019 06:55 PM  
  Hgb, External 13.3 01/16/2019  
  Hct, External 38.5 01/16/2019  
  Platelet cnt., External 226 01/16/2019  
  
     
Lab Results Component Value Date/Time  
  Sodium 138 04/10/2019 07:15 PM  
  Potassium 3.0 (L) 04/10/2019 07:15 PM  
  Chloride 105 04/10/2019 07:15 PM  
  CO2 26 04/10/2019 07:15 PM  
  Anion gap 7 04/10/2019 07:15 PM  
  Glucose 136 (H) 04/10/2019 07:15 PM  
  BUN 4 (L) 04/10/2019 07:15 PM  
  Creatinine 0.39 (L) 04/10/2019 07:15 PM  
   GFR est AA >60 04/10/2019 07:15 PM  
  GFR est non-AA >60 04/10/2019 07:15 PM  
  Calcium 8.6 04/10/2019 07:15 PM  
  Bilirubin, total 1.5 (H) 04/10/2019 07:15 PM  
  AST (SGOT) 106 (H) 04/10/2019 07:15 PM  
  Alk. phosphatase 206 (H) 04/10/2019 07:15 PM  
  Protein, total 6.7 04/10/2019 07:15 PM  
  Albumin 3.1 (L) 04/10/2019 07:15 PM  
  Globulin 3.6 (H) 04/10/2019 07:15 PM  
  A-G Ratio 0.9 04/10/2019 07:15 PM  
  ALT (SGPT) 130 (H) 04/10/2019 07:15 PM  
  
White count has decreased, but lft's have doubled. Patient states she wants surgery. Will keep npo after midnight and recheck labs in am. Patient advised that general surgery would be consulted in the am, but that they may or may not recommend surgery. She understands. 
  
Heidi Avila MD 
   
  
Patient Demographics Patient Name Joan Varela [facility] 
[id number] Sex Female  
1990 Address [address] [place], [place] [address] Phone [phone] (Home) [phone] (Mobile) *Preferred*  
CSN:  
[id number] Admit Date: Admit Time Room Bed Apr 10, 2019  5:10 PM [address] [[id number]] 01 Sylvia Palacios Attending Providers Provider Pager From Alfredo Bermudez DO  04/10/19 04/12/19 Emergency Contact(s) Name Relation Home Work Mobile [address]  [phone] Utilization Reviews  
 
   
ADDITIONAL NOTES FOR 2019 REVIEW by Max Marie RN  
 
   
Review Entered Review Status 2019 13:49 In Primary  
   
Criteria Review Labs: 
     
Recent Labs  
  19 
1451 WBC 8.7 HGB 10.6*    
K 3.2*  
 CO2 26 BUN 2*  
CREA 0.33* GLU 88 TBILI 0.4 SGOT 72* * *  
  
Patient Vitals for the past 24 hrs: 
  BP Temp Pulse Resp  
19 0800 122/71 99.2 °F (37.3 °C) 77 16  
04/10/19 2207 121/60 97.5 °F (36.4 °C) 74 18  
04/10/19 2021 113/64  74 18  
04/10/19 1727 117/65 97.1 °F (36.2 °C) 73 20  
  
GENERAL SURGERY:  
 Recommend MRCP and if positive will consult GI May have clear liquids after MRCP if negative Patient is adament to go to surgery and repeated this multiple times. She is not eager for other procedures. I explained the necessity of these procedures in detail and she agrees to MRCP.  
   
   
Gallbladder or Bile Duct Inflammation or Stone - Care Day 2 (2019) by Abena Adam RN  
 
   
Review Entered Review Status 2019 13:48 Completed  
   
Criteria Review Care Day: 2 Care Date: 2019 Level of Care: Inpatient Floor Guideline Day 2 Level Of Care (X) Floor 2019 13:48:34 EDT by Fabienne Granda L&D Clinical Status  
(X) * Nausea and vomiting absent or improved 2019 13:48:34 EDT by Fabienne Granda RUQ abdominal pain   
  
(X) * Pain absent or managed Activity ( ) Bed rest   
2019 13:48:34 EDT by Fabienne Granda UP AD MERLY   
  
(X) Possible ambulation 2019 13:48:34 EDT by Fabienne Grnada UP AD MERLY Routes ( ) Clear liquid or low-fat diet 2019 13:48:34 EDT by Fabienne Granda DIET NP; May have clear liquids after MRCP if negative Interventions (X) Laboratory tests 2019 13:48:34 EDT by Fabienne Granda MRCP; pending WBC 8.7, LFTs are improved from admission: tbili 1.5---> 0.4, --->113, -->72, -->170 Medications ( ) Possible antibiotics 2019 13:48:34 EDT by Fabienne Granda She has not been on any antibiotics * Milestone  
  
   
ADDITIONAL NOTES FOR 04/10/2019 REVIEW by Abena Adam RN  
 
   
Review Entered Review Status 2019 13:42 In Primary  
   
Criteria Review NO ED TRIAGE NOTES OR ED PROVIDER NOTES AVAILABLE FOR REVIEW AT THIS TIME.  
  
H&P:  
28 y.o. female  at 21w1d  
weeks gestation who is seen for nausea and vomiting.  Has been having problems for weeks, and was seen at [facility] ed Monday and diagnosed with cholecystitis. Started on oral abx and fu arranged with general surgeon tomorrow. Did better until Tuesday night when when the vomiting began again and cannot keep anything down. Does not have objective fever but has felt \"hot and sweaty. \"  
04/10/19 2207 97.5 °F (36.4 °C) 74 121/60 80 Right arm  18    Alert 1   
04/10/19 2021  74 113/64 80 Left arm  18    Alert    
04/10/19 1727 97.1 °F (36.2 °C) 73 117/65 82   20  Room air         
   
   
Gallbladder or Bile Duct Inflammation or Stone - Care Day 1 (4/10/2019) by Deion Pardo RN  
 
   
Review Entered Review Status 4/11/2019 13:41 Completed  
   
Criteria Review Care Day: 1 Care Date: 4/10/2019 Level of Care: Inpatient Floor Guideline Day 1 Level Of Care (X) Floor or ICU[D] 4/11/2019 13:41:48 EDT by Echo Brown L&D Clinical Status  
(X) * Clinical Indications met[E] 4/11/2019 13:41:48 EDT by Echo Brown NEP15.5 (H); 25 y.o. female at 18w1d with symptomatic gall bladder disease. Activity ( ) Bed rest   
4/11/2019 13:41:48 EDT by Echo Brown UP AD MELRY Routes  
(X) NPO   
4/11/2019 13:41:48 EDT by Echo Brown DIET NPO   
  
(X) IV fluids, medications 4/11/2019 13:41:48 EDT by Echo Brown MEDS: ZOFRAN 4MG IV X1; DILAUDID 1MG IV X2; LR BOLUS INFUSION 1000ML IV X1; D5%-LR CONT 125ML/HR Interventions (X) Laboratory tests[F] 4/11/2019 13:41:48 EDT by Echo Brown   
wbc 11.9; potassium 3.0; cl 105; glu 136 Medications (X) Parenteral analgesics * Milestone  
  
   
Gallbladder or Bile Duct Inflammation or Stone - Clinical Indications for Admission to Inpatient Care by Deion Pardo RN  
 
   
Review Entered Review Status 4/11/2019 13:38 Completed  
   
Criteria Review Clinical Indications for Admission to Inpatient Care Most Recent : Echo Brown Most Recent Date: 4/11/2019 13:38:21 EDT  
 (X) Admission is indicated for patient with1 or more of the following(1)(2)(3)(4)(5)(6):  
   (X) Acute cholecystitis as indicated byALL of the following[A](7):  
      (X) Right upper quadrant pain, mass, or tenderness  
      4/11/2019 13:38:21 EDT by [facility] Abdominal Pain   
  
      (X) Systemic signs of inflammation indicated by1 or more of the following:  
         (X) White blood cell count greater than10,000/mm3(10 x109/L)or less ueuf9798/mm3(4 x109/L)          4/11/2019 13:38:21 EDT by [facility] ZGV33.5 (H)   
  
   ( ) Vomiting that is severe or persistent  
   4/11/2019 13:38:21 EDT by [facility]  
21w1d  
weeks gestation who is seen for nausea and vomiting. Has been having problems for weeks, and was seen at [facility] ed Monday and diagnosed with cholecystitis. Started on oral abx and fu arranged with general surgeon tomorrow.

## 2019-04-17 ENCOUNTER — ANESTHESIA EVENT (OUTPATIENT)
Dept: SURGERY | Age: 29
End: 2019-04-17
Payer: COMMERCIAL

## 2019-04-18 ENCOUNTER — HOSPITAL ENCOUNTER (OUTPATIENT)
Age: 29
Setting detail: OUTPATIENT SURGERY
Discharge: HOME | End: 2019-04-18
Attending: SURGERY | Admitting: SURGERY
Payer: COMMERCIAL

## 2019-04-18 ENCOUNTER — ANESTHESIA (OUTPATIENT)
Dept: SURGERY | Age: 29
End: 2019-04-18
Payer: COMMERCIAL

## 2019-04-18 VITALS
HEIGHT: 65 IN | RESPIRATION RATE: 15 BRPM | TEMPERATURE: 97.7 F | OXYGEN SATURATION: 98 % | WEIGHT: 189.2 LBS | DIASTOLIC BLOOD PRESSURE: 73 MMHG | HEART RATE: 72 BPM | BODY MASS INDEX: 31.52 KG/M2 | SYSTOLIC BLOOD PRESSURE: 118 MMHG

## 2019-04-18 DIAGNOSIS — K81.9 CHOLECYSTITIS, UNSPECIFIED: Primary | ICD-10-CM

## 2019-04-18 PROCEDURE — 77030008477 HC STYL SATN SLP COVD -A: Performed by: ANESTHESIOLOGY

## 2019-04-18 PROCEDURE — 74011250636 HC RX REV CODE- 250/636: Performed by: ANESTHESIOLOGY

## 2019-04-18 PROCEDURE — 77030002933 HC SUT MCRYL J&J -A: Performed by: SURGERY

## 2019-04-18 PROCEDURE — 77030018836 HC SOL IRR NACL ICUM -A: Performed by: SURGERY

## 2019-04-18 PROCEDURE — 77030032490 HC SLV COMPR SCD KNE COVD -B: Performed by: SURGERY

## 2019-04-18 PROCEDURE — 77030021158 HC TRCR BLN GELPRT AMR -B: Performed by: SURGERY

## 2019-04-18 PROCEDURE — 77030000038 HC TIP SCIS LAPSCP SURI -B: Performed by: SURGERY

## 2019-04-18 PROCEDURE — 77030002967 HC SUT PDS J&J -B: Performed by: SURGERY

## 2019-04-18 PROCEDURE — 77030008703 HC TU ET UNCUF COVD -A: Performed by: ANESTHESIOLOGY

## 2019-04-18 PROCEDURE — 77030008522 HC TBNG INSUF LAPRO STRY -B: Performed by: SURGERY

## 2019-04-18 PROCEDURE — 77030031139 HC SUT VCRL2 J&J -A: Performed by: SURGERY

## 2019-04-18 PROCEDURE — 74011250636 HC RX REV CODE- 250/636

## 2019-04-18 PROCEDURE — 76060000033 HC ANESTHESIA 1 TO 1.5 HR: Performed by: SURGERY

## 2019-04-18 PROCEDURE — 77030039266 HC ADH SKN EXOFIN S2SG -A: Performed by: SURGERY

## 2019-04-18 PROCEDURE — 74011250636 HC RX REV CODE- 250/636: Performed by: SURGERY

## 2019-04-18 PROCEDURE — 77030035044 HC TRCR ENDOSC VRSPRT BLDLSS COVD -C: Performed by: SURGERY

## 2019-04-18 PROCEDURE — 76210000006 HC OR PH I REC 0.5 TO 1 HR: Performed by: SURGERY

## 2019-04-18 PROCEDURE — 76210000020 HC REC RM PH II FIRST 0.5 HR: Performed by: SURGERY

## 2019-04-18 PROCEDURE — 74011000250 HC RX REV CODE- 250: Performed by: SURGERY

## 2019-04-18 PROCEDURE — 77030035051: Performed by: SURGERY

## 2019-04-18 PROCEDURE — 77030012022 HC APPL CLP ENDOSC COVD -C: Performed by: SURGERY

## 2019-04-18 PROCEDURE — 76010000161 HC OR TIME 1 TO 1.5 HR INTENSV-TIER 1: Performed by: SURGERY

## 2019-04-18 PROCEDURE — 77030039425 HC BLD LARYNG TRULITE DISP TELE -A: Performed by: ANESTHESIOLOGY

## 2019-04-18 PROCEDURE — 74011000250 HC RX REV CODE- 250

## 2019-04-18 PROCEDURE — 77030020782 HC GWN BAIR PAWS FLX 3M -B: Performed by: ANESTHESIOLOGY

## 2019-04-18 PROCEDURE — 88304 TISSUE EXAM BY PATHOLOGIST: CPT

## 2019-04-18 PROCEDURE — 74011250637 HC RX REV CODE- 250/637: Performed by: ANESTHESIOLOGY

## 2019-04-18 RX ORDER — SODIUM CHLORIDE 0.9 % (FLUSH) 0.9 %
5-40 SYRINGE (ML) INJECTION EVERY 8 HOURS
Status: DISCONTINUED | OUTPATIENT
Start: 2019-04-18 | End: 2019-04-18 | Stop reason: HOSPADM

## 2019-04-18 RX ORDER — SODIUM CHLORIDE, SODIUM LACTATE, POTASSIUM CHLORIDE, CALCIUM CHLORIDE 600; 310; 30; 20 MG/100ML; MG/100ML; MG/100ML; MG/100ML
100 INJECTION, SOLUTION INTRAVENOUS CONTINUOUS
Status: DISCONTINUED | OUTPATIENT
Start: 2019-04-18 | End: 2019-04-18 | Stop reason: HOSPADM

## 2019-04-18 RX ORDER — FENTANYL CITRATE 50 UG/ML
INJECTION, SOLUTION INTRAMUSCULAR; INTRAVENOUS AS NEEDED
Status: DISCONTINUED | OUTPATIENT
Start: 2019-04-18 | End: 2019-04-18 | Stop reason: HOSPADM

## 2019-04-18 RX ORDER — NEOSTIGMINE METHYLSULFATE 1 MG/ML
INJECTION INTRAVENOUS AS NEEDED
Status: DISCONTINUED | OUTPATIENT
Start: 2019-04-18 | End: 2019-04-18 | Stop reason: HOSPADM

## 2019-04-18 RX ORDER — SUCCINYLCHOLINE CHLORIDE 20 MG/ML
INJECTION INTRAMUSCULAR; INTRAVENOUS AS NEEDED
Status: DISCONTINUED | OUTPATIENT
Start: 2019-04-18 | End: 2019-04-18 | Stop reason: HOSPADM

## 2019-04-18 RX ORDER — LIDOCAINE HYDROCHLORIDE 20 MG/ML
INJECTION, SOLUTION EPIDURAL; INFILTRATION; INTRACAUDAL; PERINEURAL AS NEEDED
Status: DISCONTINUED | OUTPATIENT
Start: 2019-04-18 | End: 2019-04-18 | Stop reason: HOSPADM

## 2019-04-18 RX ORDER — ACETAMINOPHEN 500 MG
1000 TABLET ORAL ONCE
Status: COMPLETED | OUTPATIENT
Start: 2019-04-18 | End: 2019-04-18

## 2019-04-18 RX ORDER — BUPIVACAINE HYDROCHLORIDE 5 MG/ML
INJECTION, SOLUTION EPIDURAL; INTRACAUDAL AS NEEDED
Status: DISCONTINUED | OUTPATIENT
Start: 2019-04-18 | End: 2019-04-18 | Stop reason: HOSPADM

## 2019-04-18 RX ORDER — OXYCODONE HYDROCHLORIDE 5 MG/1
10 TABLET ORAL
Status: DISCONTINUED | OUTPATIENT
Start: 2019-04-18 | End: 2019-04-18 | Stop reason: HOSPADM

## 2019-04-18 RX ORDER — SODIUM CHLORIDE 0.9 % (FLUSH) 0.9 %
5-40 SYRINGE (ML) INJECTION AS NEEDED
Status: DISCONTINUED | OUTPATIENT
Start: 2019-04-18 | End: 2019-04-18 | Stop reason: HOSPADM

## 2019-04-18 RX ORDER — OXYCODONE AND ACETAMINOPHEN 5; 325 MG/1; MG/1
1 TABLET ORAL
Qty: 30 TAB | Refills: 0 | Status: SHIPPED | OUTPATIENT
Start: 2019-04-18 | End: 2019-04-25

## 2019-04-18 RX ORDER — PROPOFOL 10 MG/ML
INJECTION, EMULSION INTRAVENOUS AS NEEDED
Status: DISCONTINUED | OUTPATIENT
Start: 2019-04-18 | End: 2019-04-18 | Stop reason: HOSPADM

## 2019-04-18 RX ORDER — LIDOCAINE HYDROCHLORIDE 10 MG/ML
0.3 INJECTION INFILTRATION; PERINEURAL ONCE
Status: DISCONTINUED | OUTPATIENT
Start: 2019-04-18 | End: 2019-04-18 | Stop reason: HOSPADM

## 2019-04-18 RX ORDER — CEFAZOLIN SODIUM/WATER 2 G/20 ML
2 SYRINGE (ML) INTRAVENOUS ONCE
Status: COMPLETED | OUTPATIENT
Start: 2019-04-18 | End: 2019-04-18

## 2019-04-18 RX ORDER — ONDANSETRON 2 MG/ML
4 INJECTION INTRAMUSCULAR; INTRAVENOUS ONCE
Status: COMPLETED | OUTPATIENT
Start: 2019-04-18 | End: 2019-04-18

## 2019-04-18 RX ORDER — GLYCOPYRROLATE 0.2 MG/ML
INJECTION INTRAMUSCULAR; INTRAVENOUS AS NEEDED
Status: DISCONTINUED | OUTPATIENT
Start: 2019-04-18 | End: 2019-04-18 | Stop reason: HOSPADM

## 2019-04-18 RX ORDER — DEXAMETHASONE SODIUM PHOSPHATE 4 MG/ML
INJECTION, SOLUTION INTRA-ARTICULAR; INTRALESIONAL; INTRAMUSCULAR; INTRAVENOUS; SOFT TISSUE AS NEEDED
Status: DISCONTINUED | OUTPATIENT
Start: 2019-04-18 | End: 2019-04-18 | Stop reason: HOSPADM

## 2019-04-18 RX ORDER — ROCURONIUM BROMIDE 10 MG/ML
INJECTION, SOLUTION INTRAVENOUS AS NEEDED
Status: DISCONTINUED | OUTPATIENT
Start: 2019-04-18 | End: 2019-04-18 | Stop reason: HOSPADM

## 2019-04-18 RX ORDER — ONDANSETRON 2 MG/ML
INJECTION INTRAMUSCULAR; INTRAVENOUS AS NEEDED
Status: DISCONTINUED | OUTPATIENT
Start: 2019-04-18 | End: 2019-04-18 | Stop reason: HOSPADM

## 2019-04-18 RX ORDER — HYDROMORPHONE HYDROCHLORIDE 2 MG/ML
0.5 INJECTION, SOLUTION INTRAMUSCULAR; INTRAVENOUS; SUBCUTANEOUS
Status: DISCONTINUED | OUTPATIENT
Start: 2019-04-18 | End: 2019-04-18 | Stop reason: HOSPADM

## 2019-04-18 RX ADMIN — ONDANSETRON 4 MG: 2 INJECTION INTRAMUSCULAR; INTRAVENOUS at 09:13

## 2019-04-18 RX ADMIN — SODIUM CHLORIDE, SODIUM LACTATE, POTASSIUM CHLORIDE, AND CALCIUM CHLORIDE: 600; 310; 30; 20 INJECTION, SOLUTION INTRAVENOUS at 08:20

## 2019-04-18 RX ADMIN — ACETAMINOPHEN 1000 MG: 500 TABLET, FILM COATED ORAL at 06:43

## 2019-04-18 RX ADMIN — PROPOFOL 20 MG: 10 INJECTION, EMULSION INTRAVENOUS at 08:24

## 2019-04-18 RX ADMIN — Medication 2 G: at 07:21

## 2019-04-18 RX ADMIN — NEOSTIGMINE METHYLSULFATE 3 MG: 1 INJECTION INTRAVENOUS at 08:23

## 2019-04-18 RX ADMIN — SODIUM CHLORIDE, SODIUM LACTATE, POTASSIUM CHLORIDE, AND CALCIUM CHLORIDE: 600; 310; 30; 20 INJECTION, SOLUTION INTRAVENOUS at 07:21

## 2019-04-18 RX ADMIN — SODIUM CHLORIDE, SODIUM LACTATE, POTASSIUM CHLORIDE, AND CALCIUM CHLORIDE 100 ML/HR: 600; 310; 30; 20 INJECTION, SOLUTION INTRAVENOUS at 06:44

## 2019-04-18 RX ADMIN — DEXAMETHASONE SODIUM PHOSPHATE 10 MG: 4 INJECTION, SOLUTION INTRA-ARTICULAR; INTRALESIONAL; INTRAMUSCULAR; INTRAVENOUS; SOFT TISSUE at 07:36

## 2019-04-18 RX ADMIN — ONDANSETRON 4 MG: 2 INJECTION INTRAMUSCULAR; INTRAVENOUS at 08:23

## 2019-04-18 RX ADMIN — GLYCOPYRROLATE 0.4 MG: 0.2 INJECTION INTRAMUSCULAR; INTRAVENOUS at 08:23

## 2019-04-18 RX ADMIN — FENTANYL CITRATE 100 MCG: 50 INJECTION, SOLUTION INTRAMUSCULAR; INTRAVENOUS at 07:29

## 2019-04-18 RX ADMIN — ROCURONIUM BROMIDE 20 MG: 10 INJECTION, SOLUTION INTRAVENOUS at 07:39

## 2019-04-18 RX ADMIN — PROPOFOL 180 MG: 10 INJECTION, EMULSION INTRAVENOUS at 07:29

## 2019-04-18 RX ADMIN — FENTANYL CITRATE 50 MCG: 50 INJECTION, SOLUTION INTRAMUSCULAR; INTRAVENOUS at 08:36

## 2019-04-18 RX ADMIN — SUCCINYLCHOLINE CHLORIDE 180 MG: 20 INJECTION INTRAMUSCULAR; INTRAVENOUS at 07:29

## 2019-04-18 RX ADMIN — HYDROMORPHONE HYDROCHLORIDE 0.5 MG: 2 INJECTION, SOLUTION INTRAMUSCULAR; INTRAVENOUS; SUBCUTANEOUS at 08:52

## 2019-04-18 RX ADMIN — FENTANYL CITRATE 50 MCG: 50 INJECTION, SOLUTION INTRAMUSCULAR; INTRAVENOUS at 08:31

## 2019-04-18 RX ADMIN — LIDOCAINE HYDROCHLORIDE 100 MG: 20 INJECTION, SOLUTION EPIDURAL; INFILTRATION; INTRACAUDAL; PERINEURAL at 07:29

## 2019-04-18 RX ADMIN — ROCURONIUM BROMIDE 10 MG: 10 INJECTION, SOLUTION INTRAVENOUS at 07:29

## 2019-04-18 NOTE — DISCHARGE INSTRUCTIONS
Discharge Instructions:    1. May shower. No tub baths. 2. Diet: as tolerated. 3. No driving while taking pain medication. 4. No lifting over 10 pounds. Patient Education        Cholecystectomy: What to Expect at Home  Your Recovery  After your surgery, it is normal to feel weak and tired for several days after you return home. Your belly may be swollen. If you had laparoscopic surgery, you may also have pain in your shoulder for about 24 hours. You may have gas or need to burp a lot at first, and a few people get diarrhea. The diarrhea usually goes away in 2 to 4 weeks, but it may last longer. How quickly you recover depends on whether you had a laparoscopic or open surgery. · For a laparoscopic surgery, most people can go back to work or their normal routine in 1 to 2 weeks, but it may take longer, depending on the type of work you do. · For an open surgery, it will probably take 4 to 6 weeks before you get back to your normal routine. This care sheet gives you a general idea about how long it will take for you to recover. However, each person recovers at a different pace. Follow the steps below to get better as quickly as possible. How can you care for yourself at home? Activity    · Rest when you feel tired. Getting enough sleep will help you recover.     · Try to walk each day. Start out by walking a little more than you did the day before. Gradually increase the amount you walk. Walking boosts blood flow and helps prevent pneumonia and constipation.     · For about 2 to 4 weeks, avoid lifting anything that would make you strain. This may include a child, heavy grocery bags and milk containers, a heavy briefcase or backpack, cat litter or dog food bags, or a vacuum .     · Avoid strenuous activities, such as biking, jogging, weightlifting, and aerobic exercise, until your doctor says it is okay.     · You may shower 24 to 48 hours after surgery, if your doctor okays it.  Pat the cut (incision) dry. Do not take a bath for the first 2 weeks, or until your doctor tells you it is okay.     · You may drive when you are no longer taking pain medicine and can quickly move your foot from the gas pedal to the brake. You must also be able to sit comfortably for a long period of time, even if you do not plan to go far. You might get caught in traffic.     · For a laparoscopic surgery, most people can go back to work or their normal routine in 1 to 2 weeks, but it may take longer. For an open surgery, it will probably take 4 to 6 weeks before you get back to your normal routine.     · Your doctor will tell you when you can have sex again.    Diet    · Eat smaller meals more often instead of fewer larger meals. You can eat a normal diet, but avoid eating fatty foods for about 1 month. Fatty foods include hamburger, whole milk, cheese, and many snack foods. If your stomach is upset, try bland, low-fat foods like plain rice, broiled chicken, toast, and yogurt.     · Drink plenty of fluids (unless your doctor tells you not to).   · If you have diarrhea, try avoiding spicy foods, dairy products, fatty foods, and alcohol. You can also watch to see if specific foods cause it, and stop eating them. If the diarrhea continues for more than 2 weeks, talk to your doctor.     · You may notice that your bowel movements are not regular right after your surgery. This is common. Try to avoid constipation and straining with bowel movements. You may want to take a fiber supplement every day. If you have not had a bowel movement after a couple of days, ask your doctor about taking a mild laxative. Medicines    · Your doctor will tell you if and when you can restart your medicines. He or she will also give you instructions about taking any new medicines.     · If you take blood thinners, such as warfarin (Coumadin), clopidogrel (Plavix), or aspirin, be sure to talk to your doctor.  He or she will tell you if and when to start taking those medicines again. Make sure that you understand exactly what your doctor wants you to do.     · Take pain medicines exactly as directed. ? If the doctor gave you a prescription medicine for pain, take it as prescribed. ? If you are not taking a prescription pain medicine, take an over-the-counter medicine such as acetaminophen (Tylenol), ibuprofen (Advil, Motrin), or naproxen (Aleve). Read and follow all instructions on the label. ? Do not take two or more pain medicines at the same time unless the doctor told you to. Many pain medicines contain acetaminophen, which is Tylenol. Too much Tylenol can be harmful.     · If you think your pain medicine is making you sick to your stomach:  ? Take your medicine after meals (unless your doctor tells you not to). ? Ask your doctor for a different pain medicine.     · If your doctor prescribed antibiotics, take them as directed. Do not stop taking them just because you feel better. You need to take the full course of antibiotics. Incision care    · If you have strips of tape on the incision, or cut, leave the tape on for a week or until it falls off.     · After 24 to 48 hours, wash the area daily with warm, soapy water, and pat it dry.     · You may have staples to hold the cut together. Keep them dry until your doctor takes them out. This is usually in 7 to 10 days.     · Keep the area clean and dry. You may cover it with a gauze bandage if it weeps or rubs against clothing. Change the bandage every day.    Ice    · To reduce swelling and pain, put ice or a cold pack on your belly for 10 to 20 minutes at a time. Do this every 1 to 2 hours. Put a thin cloth between the ice and your skin. Follow-up care is a key part of your treatment and safety. Be sure to make and go to all appointments, and call your doctor if you are having problems. It's also a good idea to know your test results and keep a list of the medicines you take.   When should you call for help? Call 911 anytime you think you may need emergency care. For example, call if:    · You passed out (lost consciousness).     · You are short of breath. Dionna Heady your doctor now or seek immediate medical care if:    · You are sick to your stomach and cannot drink fluids.     · You have pain that does not get better when you take your pain medicine.     · You cannot pass stools or gas.     · You have signs of infection, such as:  ? Increased pain, swelling, warmth, or redness. ? Red streaks leading from the incision. ? Pus draining from the incision. ? A fever.     · Bright red blood has soaked through the bandage over your incision.     · You have loose stitches, or your incision comes open.     · You have signs of a blood clot in your leg (called a deep vein thrombosis), such as:  ? Pain in your calf, back of knee, thigh, or groin. ? Redness and swelling in your leg or groin.    Watch closely for any changes in your health, and be sure to contact your doctor if you have any problems. Where can you learn more? Go to http://tal-pradeep.info/. Enter [phone] in the search box to learn more about \"Cholecystectomy: What to Expect at Home. \"  Current as of: March 27, 2018  Content Version: 11.9  © 2590-4257 [employer]. Care instructions adapted under license by [employer] (which disclaims liability or warranty for this information). If you have questions about a medical condition or this instruction, always ask your healthcare professional. [place] [address] any warranty or liability for your use of this information.

## 2019-04-18 NOTE — ANESTHESIA POSTPROCEDURE EVALUATION
Procedure(s): CHOLECYSTECTOMY LAPAROSCOPIC/ PT 21WKS-FETAL HEART TONES TO BE MONITORED IN PRE-OP AND PACU PER DR WOODALL. general 
 
Anesthesia Post Evaluation Multimodal analgesia: multimodal analgesia used between 6 hours prior to anesthesia start to PACU discharge Patient location during evaluation: PACU Patient participation: complete - patient participated Level of consciousness: sleepy but conscious Pain management: adequate Airway patency: patent Anesthetic complications: no 
Cardiovascular status: acceptable Respiratory status: acceptable Hydration status: acceptable Post anesthesia nausea and vomiting:  none Vitals Value Taken Time /65 4/18/2019  8:45 AM  
Temp 36.5 °C (97.7 °F) 4/18/2019  8:40 AM  
Pulse 72 4/18/2019  8:45 AM  
Resp 14 4/18/2019  8:45 AM  
SpO2 100 % 4/18/2019  8:45 AM

## 2019-04-18 NOTE — PROGRESS NOTES
Spiritual care and pre-op prayer given to pt and . Patient also asked for prayer for her baby. Given. WIL Davis

## 2019-04-18 NOTE — ANESTHESIA PREPROCEDURE EVALUATION
Relevant Problems No relevant active problems Anesthetic History No history of anesthetic complications Review of Systems / Medical History Patient summary reviewed and pertinent labs reviewed Pulmonary Within defined limits Neuro/Psych Within defined limits Cardiovascular Exercise tolerance: >4 METS 
  
GI/Hepatic/Renal 
  
GERD Endo/Other Other Findings Comments: 21 weeks pregnant Physical Exam 
 
Airway Mallampati: I 
TM Distance: > 6 cm Neck ROM: normal range of motion Mouth opening: Normal 
 
 Cardiovascular Rhythm: regular Rate: normal 
 
 
 
 Dental 
No notable dental hx Pulmonary Breath sounds clear to auscultation Abdominal 
 
 
 
 Other Findings Anesthetic Plan ASA: 2 Anesthesia type: general 
 
 
 
 
Induction: Intravenous Anesthetic plan and risks discussed with: Patient and Spouse FHTs surrounding procedure

## 2019-04-18 NOTE — OP NOTES
Christophe [address], [address] [facility]  [phone]    Graylon Stains    Admit date: 2019    MRN: [id number]     : 1990     Age: 25 y.o.          2019 8:30 AM    Cholecystectomy     Indications: Pt presented with symptomatic cholelithiasis and intractable vomiting. Risks, benefits, and alternatives to cholecystectomy were discussed with the patient. Appropriate consent was obtained. Pre-Op Diagnosis: Chronic cholecystitis [K81.1]    Post-Op Diagnosis:  Chronic cholecystITIS      Surgeon: Adilene Diego MD    Assistants: Surgeon(s):  Riley Vuong MD    Anesthesia:  General      Findings: multiple gallstones, dilated cystic duct with impacted stones    Estimated Blood Loss:     10ml         Specimens: gallbladder and stones           Implants: * No implants in log *      Procedure Details     The patient was brought to the operating room and placed supine. After induction of a general anesthetic, the abdomen was prepped and draped in standard fashion. An incision was made in the umbilicus and a Krzysztof trocar was placed in the usual fashion after which  the abdomen was insufflated to 15mmHg sterile CO2. The other trocars were then placed under direct vision. These were positioned four fingerbreadths below the right costal margin in the anterior axillary line and mid clavicular line and just to the right of the falciform ligament in the epigatrium. The gallbladder was grasped at the dome and retracted laterally and to the right in order to gain the critical view. Dissection was begun with maryland dissector at the infundibulum of the gallbladder until the cystic duct was positively identified as a singular structure going directly into the gallbladder. The duct was dilated to about 1cm in diameter and multiple stones were impacted down to the common duct junction.     The cystic duct was then secured with PDS endo loops inferiorly and superiorly and divided between with scissors. The cystic artery was then clipped and divided in the cystic triangle. The gallbladder was then taken off the liver bed with electrocautery hook. No gross spillage of bile was encountered during the dissection. The gallbladder was then removed from the superior port site. The gallbladder was sent to pathology for evaluation at this point. The gallbladder fossa was without evidence of bleeding, there was no bleeding from the cystic artery stump and there was no evidence of bile leak from the cystic duct stump. The three upper abdominal trocars were removed under the direct vision without bleeding from the trocar sites. The Krzysztof trocar was removed and the fascia of the umbilicus was closed with 0'0  Vicryl. The trocar sites were closed with the subcuticular Monocryl dermabond dressing applied. The patient was taken to the recovery room in good condition, having tolerated the procedure well. Instrument, sponge, and needle counts were correct prior to abdominal closure and at the conclusion of the case.      Signed: Fatemeh Bragg MD

## 2019-04-18 NOTE — INTERVAL H&P NOTE
H&P Update: 
Therese Bullocks was seen and examined. History and physical has been reviewed. The patient has been examined. There have been no significant clinical changes since the completion of the originally dated History and Physical. 
Patient identified by surgeon; surgical site was confirmed by patient and surgeon. Laparoscopic cholecystectomy Fetal heart tones to be monitored in OR

## 2019-08-21 NOTE — PROGRESS NOTES
Patient ID verified. Allergies, medical history, prenatal record and prior to admission medications verified. Pt instructed to be NPO after midnight. Pt instructed to arrive at hospital @0545,come to entrance C and sign in at the registration desk on the 4th floor. Patient instructed to come to hospital sooner if SROM, labor, or concerning symptoms. Patient verbalized understanding. Questions encouraged and answered.

## 2019-08-22 ENCOUNTER — HOSPITAL ENCOUNTER (INPATIENT)
Age: 29
LOS: 2 days | Discharge: HOME | End: 2019-08-24
Attending: OBSTETRICS & GYNECOLOGY | Admitting: OBSTETRICS & GYNECOLOGY
Payer: COMMERCIAL

## 2019-08-22 ENCOUNTER — ANESTHESIA EVENT (OUTPATIENT)
Dept: LABOR AND DELIVERY | Age: 29
End: 2019-08-22
Payer: COMMERCIAL

## 2019-08-22 ENCOUNTER — ANESTHESIA (OUTPATIENT)
Dept: LABOR AND DELIVERY | Age: 29
End: 2019-08-22
Payer: COMMERCIAL

## 2019-08-22 DIAGNOSIS — Z98.891 H/O CESAREAN SECTION: Primary | ICD-10-CM

## 2019-08-22 PROBLEM — Z3A.39 39 WEEKS GESTATION OF PREGNANCY: Status: ACTIVE | Noted: 2019-08-22

## 2019-08-22 LAB
ABO + RH BLD: NORMAL
ARTERIAL PATENCY WRIST A: ABNORMAL
ARTERIAL PATENCY WRIST A: ABNORMAL
BASE DEFICIT BLD-SCNC: 2 MMOL/L
BASE DEFICIT BLD-SCNC: 2 MMOL/L
BASOPHILS # BLD: 0 K/UL (ref 0–0.2)
BASOPHILS NFR BLD: 0 % (ref 0–2)
BDY SITE: ABNORMAL
BDY SITE: ABNORMAL
BLOOD GROUP ANTIBODIES SERPL: NORMAL
BODY TEMPERATURE: 98.6
BODY TEMPERATURE: 98.6
CO2 BLD-SCNC: 24 MMOL/L
CO2 BLD-SCNC: 25 MMOL/L
COLLECT TIME,HTIME: 801
COLLECT TIME,HTIME: 801
DIFFERENTIAL METHOD BLD: ABNORMAL
EOSINOPHIL # BLD: 0.1 K/UL (ref 0–0.8)
EOSINOPHIL NFR BLD: 1 % (ref 0.5–7.8)
ERYTHROCYTE [DISTWIDTH] IN BLOOD BY AUTOMATED COUNT: 13.3 % (ref 11.9–14.6)
GAS FLOW.O2 O2 DELIVERY SYS: ABNORMAL L/MIN
GAS FLOW.O2 O2 DELIVERY SYS: ABNORMAL L/MIN
HCO3 BLD-SCNC: 23.4 MMOL/L (ref 22–26)
HCO3 BLDV-SCNC: 22.6 MMOL/L (ref 23–28)
HCT VFR BLD AUTO: 38.4 % (ref 35.8–46.3)
HGB BLD-MCNC: 12.9 G/DL (ref 11.7–15.4)
IMM GRANULOCYTES # BLD AUTO: 0.1 K/UL (ref 0–0.5)
IMM GRANULOCYTES NFR BLD AUTO: 1 % (ref 0–5)
LYMPHOCYTES # BLD: 2.1 K/UL (ref 0.5–4.6)
LYMPHOCYTES NFR BLD: 17 % (ref 13–44)
MCH RBC QN AUTO: 29.1 PG (ref 26.1–32.9)
MCHC RBC AUTO-ENTMCNC: 33.6 G/DL (ref 31.4–35)
MCV RBC AUTO: 86.7 FL (ref 79.6–97.8)
MONOCYTES # BLD: 0.8 K/UL (ref 0.1–1.3)
MONOCYTES NFR BLD: 7 % (ref 4–12)
NEUTS SEG # BLD: 9.2 K/UL (ref 1.7–8.2)
NEUTS SEG NFR BLD: 74 % (ref 43–78)
NRBC # BLD: 0 K/UL (ref 0–0.2)
PCO2 BLDCO: 38 MMHG (ref 32–68)
PCO2 BLDCO: 43 MMHG (ref 32–68)
PH BLDCO: 7.34 [PH] (ref 7.15–7.38)
PH BLDCO: 7.38 [PH] (ref 7.15–7.38)
PLATELET # BLD AUTO: 188 K/UL (ref 150–450)
PMV BLD AUTO: 11.1 FL (ref 9.4–12.3)
PO2 BLDCO: 22 MMHG
PO2 BLDCO: 32 MMHG
RBC # BLD AUTO: 4.43 M/UL (ref 4.05–5.2)
SAO2 % BLD: 33 % (ref 95–98)
SAO2 % BLDV: 61 % (ref 65–88)
SERVICE CMNT-IMP: ABNORMAL
SERVICE CMNT-IMP: ABNORMAL
SPECIMEN EXP DATE BLD: NORMAL
SPECIMEN TYPE: ABNORMAL
SPECIMEN TYPE: ABNORMAL
WBC # BLD AUTO: 12.1 K/UL (ref 4.3–11.1)

## 2019-08-22 PROCEDURE — 77030031139 HC SUT VCRL2 J&J -A: Performed by: OBSTETRICS & GYNECOLOGY

## 2019-08-22 PROCEDURE — 77030002933 HC SUT MCRYL J&J -A: Performed by: OBSTETRICS & GYNECOLOGY

## 2019-08-22 PROCEDURE — 85027 COMPLETE CBC AUTOMATED: CPT

## 2019-08-22 PROCEDURE — 65270000029 HC RM PRIVATE

## 2019-08-22 PROCEDURE — 82803 BLOOD GASES ANY COMBINATION: CPT

## 2019-08-22 PROCEDURE — 74011250636 HC RX REV CODE- 250/636: Performed by: OBSTETRICS & GYNECOLOGY

## 2019-08-22 PROCEDURE — 76060000078 HC EPIDURAL ANESTHESIA: Performed by: OBSTETRICS & GYNECOLOGY

## 2019-08-22 PROCEDURE — 74011000250 HC RX REV CODE- 250

## 2019-08-22 PROCEDURE — 77030034696 HC CATH URETH FOL 2W BARD -A

## 2019-08-22 PROCEDURE — 77030020254 HC SOL INJ D5LR LACTATED RINGER

## 2019-08-22 PROCEDURE — 77030040361 HC SLV COMPR DVT MDII -B

## 2019-08-22 PROCEDURE — 75410000003 HC RECOV DEL/VAG/CSECN EA 0.5 HR: Performed by: OBSTETRICS & GYNECOLOGY

## 2019-08-22 PROCEDURE — 74011250637 HC RX REV CODE- 250/637: Performed by: ANESTHESIOLOGY

## 2019-08-22 PROCEDURE — 77030018846 HC SOL IRR STRL H20 ICUM -A: Performed by: OBSTETRICS & GYNECOLOGY

## 2019-08-22 PROCEDURE — 76010000392 HC C SECN EA ADDL 0.5 HR: Performed by: OBSTETRICS & GYNECOLOGY

## 2019-08-22 PROCEDURE — 77030018836 HC SOL IRR NACL ICUM -A: Performed by: OBSTETRICS & GYNECOLOGY

## 2019-08-22 PROCEDURE — 77030020255 HC SOL INJ LR 1000ML BG

## 2019-08-22 PROCEDURE — 77030032490 HC SLV COMPR SCD KNE COVD -B: Performed by: OBSTETRICS & GYNECOLOGY

## 2019-08-22 PROCEDURE — 74011250636 HC RX REV CODE- 250/636

## 2019-08-22 PROCEDURE — 74011250636 HC RX REV CODE- 250/636: Performed by: ANESTHESIOLOGY

## 2019-08-22 PROCEDURE — 77030011943: Performed by: OBSTETRICS & GYNECOLOGY

## 2019-08-22 PROCEDURE — 4A1HXCZ MONITORING OF PRODUCTS OF CONCEPTION, CARDIAC RATE, EXTERNAL APPROACH: ICD-10-PCS | Performed by: OBSTETRICS & GYNECOLOGY

## 2019-08-22 PROCEDURE — 77030003665 HC NDL SPN BBMI -A: Performed by: ANESTHESIOLOGY

## 2019-08-22 PROCEDURE — 77030034696 HC CATH URETH FOL 2W BARD -A: Performed by: OBSTETRICS & GYNECOLOGY

## 2019-08-22 PROCEDURE — 86900 BLOOD TYPING SEROLOGIC ABO: CPT

## 2019-08-22 PROCEDURE — 76010000391 HC C SECN FIRST 1 HR: Performed by: OBSTETRICS & GYNECOLOGY

## 2019-08-22 PROCEDURE — 77030007880 HC KT SPN EPDRL BBMI -B: Performed by: ANESTHESIOLOGY

## 2019-08-22 RX ORDER — TRISODIUM CITRATE DIHYDRATE AND CITRIC ACID MONOHYDRATE 500; 334 MG/5ML; MG/5ML
30 SOLUTION ORAL ONCE
Status: COMPLETED | OUTPATIENT
Start: 2019-08-22 | End: 2019-08-22

## 2019-08-22 RX ORDER — NALOXONE HYDROCHLORIDE 0.4 MG/ML
0.2 INJECTION, SOLUTION INTRAMUSCULAR; INTRAVENOUS; SUBCUTANEOUS
Status: ACTIVE | OUTPATIENT
Start: 2019-08-22 | End: 2019-08-23

## 2019-08-22 RX ORDER — MORPHINE SULFATE 1 MG/ML
INJECTION, SOLUTION EPIDURAL; INTRATHECAL; INTRAVENOUS
Status: COMPLETED | OUTPATIENT
Start: 2019-08-22 | End: 2019-08-22

## 2019-08-22 RX ORDER — NALBUPHINE HYDROCHLORIDE 10 MG/ML
5 INJECTION, SOLUTION INTRAMUSCULAR; INTRAVENOUS; SUBCUTANEOUS
Status: DISPENSED | OUTPATIENT
Start: 2019-08-22 | End: 2019-08-23

## 2019-08-22 RX ORDER — OXYTOCIN/RINGER'S LACTATE 30/500 ML
250 PLASTIC BAG, INJECTION (ML) INTRAVENOUS ONCE
Status: DISCONTINUED | OUTPATIENT
Start: 2019-08-22 | End: 2019-08-22 | Stop reason: HOSPADM

## 2019-08-22 RX ORDER — HYDROMORPHONE HYDROCHLORIDE 1 MG/ML
0.5 INJECTION, SOLUTION INTRAMUSCULAR; INTRAVENOUS; SUBCUTANEOUS
Status: DISPENSED | OUTPATIENT
Start: 2019-08-22 | End: 2019-08-23

## 2019-08-22 RX ORDER — DIPHENHYDRAMINE HCL 25 MG
50 CAPSULE ORAL ONCE
Status: COMPLETED | OUTPATIENT
Start: 2019-08-22 | End: 2019-08-22

## 2019-08-22 RX ORDER — KETOROLAC TROMETHAMINE 30 MG/ML
15 INJECTION, SOLUTION INTRAMUSCULAR; INTRAVENOUS
Status: DISPENSED | OUTPATIENT
Start: 2019-08-22 | End: 2019-08-23

## 2019-08-22 RX ORDER — ONDANSETRON 2 MG/ML
INJECTION INTRAMUSCULAR; INTRAVENOUS AS NEEDED
Status: DISCONTINUED | OUTPATIENT
Start: 2019-08-22 | End: 2019-08-22 | Stop reason: HOSPADM

## 2019-08-22 RX ORDER — OXYCODONE HYDROCHLORIDE 5 MG/1
10 TABLET ORAL
Status: ACTIVE | OUTPATIENT
Start: 2019-08-22 | End: 2019-08-23

## 2019-08-22 RX ORDER — SODIUM CHLORIDE, SODIUM LACTATE, POTASSIUM CHLORIDE, CALCIUM CHLORIDE 600; 310; 30; 20 MG/100ML; MG/100ML; MG/100ML; MG/100ML
125 INJECTION, SOLUTION INTRAVENOUS CONTINUOUS
Status: DISCONTINUED | OUTPATIENT
Start: 2019-08-22 | End: 2019-08-23

## 2019-08-22 RX ORDER — ACETAMINOPHEN 325 MG/1
650 TABLET ORAL
Status: DISCONTINUED | OUTPATIENT
Start: 2019-08-22 | End: 2019-08-24 | Stop reason: HOSPADM

## 2019-08-22 RX ORDER — KETOROLAC TROMETHAMINE 30 MG/ML
INJECTION, SOLUTION INTRAMUSCULAR; INTRAVENOUS AS NEEDED
Status: DISCONTINUED | OUTPATIENT
Start: 2019-08-22 | End: 2019-08-22 | Stop reason: HOSPADM

## 2019-08-22 RX ORDER — FAMOTIDINE 20 MG/1
20 TABLET, FILM COATED ORAL ONCE
Status: COMPLETED | OUTPATIENT
Start: 2019-08-22 | End: 2019-08-22

## 2019-08-22 RX ORDER — SODIUM CHLORIDE 0.9 % (FLUSH) 0.9 %
5-40 SYRINGE (ML) INJECTION AS NEEDED
Status: DISCONTINUED | OUTPATIENT
Start: 2019-08-22 | End: 2019-08-22 | Stop reason: HOSPADM

## 2019-08-22 RX ORDER — BUPIVACAINE HYDROCHLORIDE 7.5 MG/ML
INJECTION, SOLUTION INTRASPINAL
Status: COMPLETED | OUTPATIENT
Start: 2019-08-22 | End: 2019-08-22

## 2019-08-22 RX ORDER — DEXTROSE, SODIUM CHLORIDE, SODIUM LACTATE, POTASSIUM CHLORIDE, AND CALCIUM CHLORIDE 5; .6; .31; .03; .02 G/100ML; G/100ML; G/100ML; G/100ML; G/100ML
125 INJECTION, SOLUTION INTRAVENOUS CONTINUOUS
Status: DISCONTINUED | OUTPATIENT
Start: 2019-08-22 | End: 2019-08-22 | Stop reason: HOSPADM

## 2019-08-22 RX ORDER — SODIUM CHLORIDE 0.9 % (FLUSH) 0.9 %
5-40 SYRINGE (ML) INJECTION EVERY 8 HOURS
Status: DISCONTINUED | OUTPATIENT
Start: 2019-08-22 | End: 2019-08-22 | Stop reason: HOSPADM

## 2019-08-22 RX ORDER — SODIUM CHLORIDE, SODIUM LACTATE, POTASSIUM CHLORIDE, CALCIUM CHLORIDE 600; 310; 30; 20 MG/100ML; MG/100ML; MG/100ML; MG/100ML
500 INJECTION, SOLUTION INTRAVENOUS CONTINUOUS
Status: DISCONTINUED | OUTPATIENT
Start: 2019-08-22 | End: 2019-08-22 | Stop reason: HOSPADM

## 2019-08-22 RX ORDER — OXYTOCIN/RINGER'S LACTATE 30/500 ML
PLASTIC BAG, INJECTION (ML) INTRAVENOUS
Status: DISCONTINUED | OUTPATIENT
Start: 2019-08-22 | End: 2019-08-22 | Stop reason: HOSPADM

## 2019-08-22 RX ORDER — CEFAZOLIN SODIUM/WATER 2 G/20 ML
2 SYRINGE (ML) INTRAVENOUS ONCE
Status: COMPLETED | OUTPATIENT
Start: 2019-08-22 | End: 2019-08-22

## 2019-08-22 RX ADMIN — FAMOTIDINE 20 MG: 20 TABLET, FILM COATED ORAL at 07:09

## 2019-08-22 RX ADMIN — MORPHINE SULFATE 0.25 MG: 1 INJECTION, SOLUTION EPIDURAL; INTRATHECAL; INTRAVENOUS at 07:43

## 2019-08-22 RX ADMIN — Medication 500 ML/HR: at 08:02

## 2019-08-22 RX ADMIN — SODIUM CITRATE AND CITRIC ACID MONOHYDRATE 30 ML: 500; 334 SOLUTION ORAL at 07:09

## 2019-08-22 RX ADMIN — SODIUM CHLORIDE, SODIUM LACTATE, POTASSIUM CHLORIDE, AND CALCIUM CHLORIDE 125 ML/HR: 600; 310; 30; 20 INJECTION, SOLUTION INTRAVENOUS at 16:49

## 2019-08-22 RX ADMIN — ONDANSETRON 4 MG: 2 INJECTION INTRAMUSCULAR; INTRAVENOUS at 08:03

## 2019-08-22 RX ADMIN — Medication 2 G: at 07:30

## 2019-08-22 RX ADMIN — NALBUPHINE HYDROCHLORIDE 5 MG: 10 INJECTION, SOLUTION INTRAMUSCULAR; INTRAVENOUS; SUBCUTANEOUS at 09:12

## 2019-08-22 RX ADMIN — NALBUPHINE HYDROCHLORIDE 5 MG: 10 INJECTION, SOLUTION INTRAMUSCULAR; INTRAVENOUS; SUBCUTANEOUS at 21:08

## 2019-08-22 RX ADMIN — DIPHENHYDRAMINE HYDROCHLORIDE 50 MG: 25 CAPSULE ORAL at 16:48

## 2019-08-22 RX ADMIN — KETOROLAC TROMETHAMINE 30 MG: 30 INJECTION, SOLUTION INTRAMUSCULAR; INTRAVENOUS at 08:19

## 2019-08-22 RX ADMIN — BUPIVACAINE HYDROCHLORIDE 12.5 MG: 7.5 INJECTION, SOLUTION INTRASPINAL at 07:43

## 2019-08-22 RX ADMIN — KETOROLAC TROMETHAMINE 15 MG: 30 INJECTION, SOLUTION INTRAMUSCULAR at 20:28

## 2019-08-22 RX ADMIN — SODIUM CHLORIDE, SODIUM LACTATE, POTASSIUM CHLORIDE, AND CALCIUM CHLORIDE: 600; 310; 30; 20 INJECTION, SOLUTION INTRAVENOUS at 07:35

## 2019-08-22 NOTE — PROGRESS NOTES
Dr Jeremiah Zelaya notified of patient complaint of worsening itching. Nubain given at 0912. Telephone order received for Benadryl 50 mg PO x1 dose. Read back.

## 2019-08-22 NOTE — PROGRESS NOTES
Offered Benadryl as ordered by Dr Meeta Chapman. Patient states her other child is coming and she would like to wait until he leaves to take medication. Patient to call RN when ready.

## 2019-08-22 NOTE — LACTATION NOTE

## 2019-08-22 NOTE — ANESTHESIA PREPROCEDURE EVALUATION
Relevant Problems   No relevant active problems       Anesthetic History               Review of Systems / Medical History  Patient summary reviewed, nursing notes reviewed and pertinent labs reviewed    Pulmonary                   Neuro/Psych              Cardiovascular                  Exercise tolerance: >4 METS     GI/Hepatic/Renal                Endo/Other             Other Findings              Physical Exam    Airway  Mallampati: I  TM Distance: 4 - 6 cm  Neck ROM: normal range of motion   Mouth opening: Normal     Cardiovascular  Regular rate and rhythm,  S1 and S2 normal,  no murmur, click, rub, or gallop             Dental  No notable dental hx       Pulmonary  Breath sounds clear to auscultation               Abdominal  GI exam deferred       Other Findings            Anesthetic Plan    ASA: 2  Anesthesia type: spinal      Post-op pain plan if not by surgeon: intrathecal opiates    Induction: Intravenous  Anesthetic plan and risks discussed with: Patient

## 2019-08-22 NOTE — ROUTINE PROCESS
SBAR IN Report: Mother    Verbal report received from Nithya Chairez RN on this patient, who is now being transferred from L&D (unit) for routine progression of care. The patient is wearing a green \"Anesthesia-Duramorph\" band. Report consisted of patient's Situation, Background, Assessment and Recommendations (SBAR).  ID bands were compared with the identification form, and verified with the patient and transferring nurse. Information from the SBAR, Kardex, OR Summary, Procedure Summary, Intake/Output, MAR and Recent Results and the [place] Report was reviewed with the transferring nurse; opportunity for questions and clarification provided.

## 2019-08-22 NOTE — H&P
History & Physical    Name: Aren Espinoza MRN: [id number]  SSN: [id number]    YOB: 1990  Age: 25 y.o. Sex: female      Subjective:     Estimated Date of Delivery: 19  OB History    Para Term  AB Living   2 1 1     1   SAB TAB Ectopic Molar Multiple Live Births           0 1      # Outcome Date GA Lbr Andres/2nd Weight Sex Delivery Anes PTL Lv   2 Current            1 Term 16 40w3d  6 lb 4.5 oz (2.85 kg) M CS-LTranv EPIDURAL AN N SHUBHAM      Complications: Fetal Intolerance       Ms. Guerrero admitted with pregnancy at 40w2d for  section due to previous  section. Prenatal course was normal. Please see prenatal records for details. Past Medical History:   Diagnosis Date    Acute cholecystitis     Bowel trouble     Gallstones     GERD (gastroesophageal reflux disease)     Managed with meds     Melanoma ([facility] [address]     left hip- surgery with lymph node removal      Past Surgical History:   Procedure Laterality Date    HX  SECTION      x1    HX CHOLECYSTECTOMY      HX HEENT      wisdom teeth    HX LYMPH NODE DISSECTION      groin    HX OTHER SURGICAL      melanoma removed left hip     Social History     Occupational History    Not on file   Tobacco Use    Smoking status: Never Smoker    Smokeless tobacco: Never Used   Substance and Sexual Activity    Alcohol use: No     Alcohol/week: 0.0 standard drinks     Frequency: Never     Comment: none since +pt    Drug use: Yes     Types: Marijuana     Comment: Smoked marijuana about 2 months ago.  Not a heavy smoker     Sexual activity: Yes     Partners: Male     Birth control/protection: None     Family History   Problem Relation Age of Onset    Ovarian Cancer Mother     Alcohol abuse Mother     Depression Mother     Bipolar Disorder Mother     Alcohol abuse Father     Depression Father     Suicide Father     Diabetes Maternal Grandmother     COPD Maternal Grandmother     Atrial Fibrillation Maternal Grandmother     Diabetes Paternal Grandmother     Cancer Paternal Grandfather         throat       No Known Allergies  Prior to Admission medications    Medication Sig Start Date End Date Taking? Authorizing Provider   docusate sodium (COLACE) 100 mg capsule Take 100 mg by mouth two (2) times a day. Yes Provider, Historical   raNITIdine (ZANTAC) 300 mg tab Take  by mouth as needed. Yes Provider, Historical   PNV COMBO#47/IRON/FA #1/DHA (PNV-DHA PO) Take 1 Tab by mouth daily. Yes Provider, Historical        Review of Systems: A comprehensive review of systems was negative except for that written in the History of Present Illness. Objective:     Vitals:  Vitals:    19 0640   BP: 117/71   Pulse: 85        Physical Exam:  Patient without distress. Heart: Regular rate and rhythm  Lung: clear to auscultation throughout lung fields, no wheezes, no rales, no rhonchi and normal respiratory effort  Membranes:  Intact  Fetal Heart Rate: Reactive    Prenatal Labs:   Lab Results   Component Value Date/Time    ABO/Rh(D) A POSITIVE 2019 06:20 AM    Rubella, External immune 2019    GrBStrep, External Negative 2019    HBsAg, External negative 2019    HIV, External non reactive 2019    RPR, External non reactive 2019    ABO,Rh A Positive 2019         Impression/Plan:     Plan:  Admit for  section. Group B Strep was negative. Discussed the risks of surgery including the risks of bleeding, infection, deep vein thrombosis, and surgical injuries to internal organs including but not limited to the bowels, bladder, rectum, and female reproductive organs. The patient understands the risks; any and all questions were answered to the patient's satisfaction.

## 2019-08-22 NOTE — L&D DELIVERY NOTE
Delivery Summary    Patient: Donte Bernardo MRN: [id number]  SSN: [id number]    YOB: 1990  Age: 25 y.o. Sex: female        Information for the patient's :  Aldair Peters [[id number]]       Labor Events:    Labor: No    Steroids: None   Cervical Ripening Date/Time:       Cervical Ripening Type: None   Antibiotics During Labor: No   Rupture Identifier: Sac 1    Rupture Date/Time: 2019 8:00 AM   Rupture Type: AROM   Amniotic Fluid Volume: Moderate    Amniotic Fluid Description: Clear    Amniotic Fluid Odor: None    Induction: None       Induction Date/Time:        Indications for Induction:      Augmentation: None   Augmentation Date/Time:      Indications for Augmentation:     Labor complications: None       Additional complications:        Delivery Events:  Indications For Episiotomy:     Episiotomy: None   Perineal Laceration(s):     Repaired:     Periurethral Laceration Location:      Repaired:     Labial Laceration Location:     Repaired:     Sulcal Laceration Location:     Repaired:     Vaginal Laceration Location:     Repaired:     Cervical Laceration Location:     Repaired:     Repair Suture:     Number of Repair Packets:     Estimated Blood Loss (ml):  ml     Delivery Date: 2019    Delivery Time: 8:01 AM   Delivery Type: , Low Transverse     Details    Trial of Labor: No   Primary/Repeat: Repeat   Priority: Routine   Indications:  Prior Uterine Surgery       Sex:  Female     Gestational Age: 41w4d  Delivery Clinician: Naveen Joy  Living Status: Living   Delivery Location: OR            APGARS  One minute Five minutes Ten minutes   Skin color: 1   1        Heart rate: 2   2        Grimace: 2   2        Muscle tone: 2   2        Breathin   2        Totals: 9   9          Presentation: Vertex    Position: Left Occiput Transverse  Resuscitation Method:  Suctioning-bulb; Tactile Stimulation     Meconium Stained: None      Cord Information: 3 Vessels  Complications: None;Nuchal Cord Without Compressions  Cord around: head  Delayed cord clamping? Yes  Cord clamped date/time:   Disposition of Cord Blood: Lab    Blood Gases Sent?: Yes    Placenta:  Date/Time: 2019  8:02 AM  Removal: Expressed      Appearance: Normal;Intact      Measurements:  Birth Weight: 7 lb 0.2 oz (3.18 kg)      Birth Length: 1' 7.75\" (0.502 m)      Head Circumference: 1' 1.58\" (0.345 m)      Chest Circumference: 1' 0.99\" (0.33 m)     Abdominal Girth:       Other Providers:   VARSHA Ellis;NIXON EVANS;MAXWELL HO;AGUSTINA ULLOA;EVERARDO MCKEON;RAYMUNDO ACKERMAN;ZITA MCDERMOTT, Obstetrician;Primary Nurse;Primary [place] Nurse;Respiratory Therapist;Neonatologist;Anesthesiologist;Crna;Scrub Tech;Scrub Tech             Group B Strep:   Lab Results   Component Value Date/Time    GrBStrep, External Negative 2019     Information for the patient's :  Eliazar Mcintoshbyron [[id number]]   No results found for: ABORH, PCTABR, PCTDIG, BILI, ABORHEXT, ABORH    Recent Labs     19  0813   PCO2CB 43   PO2CB 22   HCO3I 23.4   SO2I 33*   IBD 2   PTEMPI 98.6   SPECTI ARTERIAL CORD   PHICB 7.339   ISITE CORD   IDEV OTHER   IALLEN NOT APPLICABLE

## 2019-08-22 NOTE — ANESTHESIA POSTPROCEDURE EVALUATION
Procedure(s):   SECTION.     spinal    Anesthesia Post Evaluation      Multimodal analgesia: multimodal analgesia used between 6 hours prior to anesthesia start to PACU discharge  Patient location during evaluation: PACU  Patient participation: complete - patient participated  Level of consciousness: awake and awake and alert  Pain management: adequate  Airway patency: patent  Anesthetic complications: no  Cardiovascular status: acceptable  Respiratory status: acceptable  Hydration status: acceptable  Post anesthesia nausea and vomiting:  controlled      Vitals Value Taken Time   /60 2019  9:16 AM   Temp     Pulse 68 2019  9:16 AM   Resp 20 2019  9:16 AM   SpO2 93 % 2019  9:16 AM

## 2019-08-22 NOTE — PROGRESS NOTES
SBAR OUT Report: Mother    Verbal report given to [facility] RN (full name & credentials) on this patient, who is now being transferred to MIU (unit) for routine progression of care. The patient is wearing a green \"Anesthesia-Duramorph\" band. Report consisted of patient's Situation, Background, Assessment and Recommendations (SBAR). [place] ID bands were compared with the identification form, and verified with the patient and receiving nurse. Information from the SBAR and the [address] Report was reviewed with the receiving nurse; opportunity for questions and clarification provided.

## 2019-08-22 NOTE — OP NOTES
INTRAUTERINE PREGNANCY  SECTION     Antony Dodd  [id number]    DATE OF PROCEDURE:  2019    PREOPERATIVE DIAGNOSIS:  anisha 2019 Repeat  Section    POSTOPERATIVE DIAGNOSIS:  Same as preoperative diagnosis      with operative delivery of a 7 lb 0 oz female with Apgars of 9 and 9. ADDITIONAL DIAGNOSES: none    PROCEDURE: Intrauterine pregnancy,  section low transverse    SURGEON:  Toña Mccormack MD    ASSISTANT:  none    ANESTHESIA: Spinal    EBL: 600 ml    SPECIMENS:   ID Type Source Tests Collected by Time Destination   1 :  Placenta   Gilles MD Dmitri 2019 6375 Discarded        COMPLICATIONS: none    OPERATIVE PROCEDURE: Patient was placed on the operating room table in the supine position/left lateral tilt, carrasco catheter in place, pneumatic compression hose on and operating. Time out was done to confirm the operating procedure, surgeon, patient and site. Once confirmed by the team, procedure was started. The patient was prepped and draped in the usual fashion for abdominal surgery. Adequate anesthesia was confirmed, A Pfannenstiel incision was made and carried down sharply through the skin, subcutaneous tissue, and fascia. Fascia was sharply dissected free from underlying rectus muscles. The peritoneum was sharply entered and extended vertically with good visualization of bowel and bladder. The bladder blade was then placed over the bladder. The visceroperitoneal reflection over the lower uterine segment was incised transversely and the bladder flap sharply and bluntly developed. The bladder blade was reinserted in this space. A low transverse hysterotomy incision was made with return of clear fluid then extended bluntly, and the infants head was delivered from the pelvis with gentle fundal pressure. The cord was clamped and cut. Mouth and nose were suctioned clean. The infant was given to the [facility] personel present at the time of delivery.  The placenta was delivered with fundal massage. The uterus was exteriorized, wrapped in a wet lap square, curetted with a dry square, and closed in a double-layered fashion with 0-vicryl. Hemostasis appeared adequate. The cul-de-sac was then irrigated and suctioned clean. Bladder flap was irrigated. The uterus was replaced in the abdomen. The gutters were irrigated and suctioned clean. The hysterotomy incision was noted to be hemostatic. The peritoneum was closed with 3-0 vicryl and rectus muscle reapproximated with 0-vicryl. The subfascial layer made hemostatic with electrocautery. Fascia closed with #0-PDS running. Subcutaneous tissue irrigated, noted to be hemostatic and reapproximated with 3-0 vicryl. Skin then closed with 4-0 monocryl in a subcuticular fashion. All sponge, lap, instrument, and needle counts correct times two. The patient tolerated the procedure well and went back to her room in satisfactory condition. Infant was with the mother.

## 2019-08-22 NOTE — LACTATION NOTE
This note was copied from a baby's chart. Lactation visit. 2nd time mom, nursed first x 18 months. First with tongue tie and asymmetric jawline, but did breastfeed x 18 months. This baby also appears to have asymmetric jawline but no tongue tie per mom and MD when assessed. Has been latching very well at all feeds so far per mom. Mom ready to attempt to feed now. Attempted on left breast briefly in football hold, baby would root and latch but only stay on briefly and then push off, not interested now. Reassured mom to attempt again soon when baby showing cues to feed. Soon after attempt at the breast , baby gagging and then spit up and became choked. Copious mucous and fluid and colostrum coming from nose and mouth. Took several seconds for LC to clear airway with bulb syringe, baby red and stiffening arms and legs when choking. Mouth and nose cleared and baby crying and pink. Baby continued to gag and spit up x 2 more but never appeared to get choked again during [facility] visit. Baby skin to skin with mom in upright hold now. RN updated. Reviewed first 24 hour expectations with parents. Lactation to follow up again tomorrow.

## 2019-08-22 NOTE — ANESTHESIA PROCEDURE NOTES
Spinal Block    Start time: 8/22/2019 7:38 AM  End time: 8/22/2019 7:43 AM  Performed by: Chago Doty MD  Authorized by: Chago Doty MD     Pre-procedure:   Indications: primary anesthetic  Preanesthetic Checklist: patient identified, risks and benefits discussed, anesthesia consent, site marked, patient being monitored and timeout performed    Timeout Time: 07:38          Spinal Block:   Patient Position:  Seated  Prep Region:  Lumbar  Prep: chlorhexidine      Location:  L3-4  Technique:  Single shot    Local Dose (mL):  3    Needle:   Needle Type:  Pencan  Needle Gauge:  25 G  Attempts:  1      Events: CSF confirmed, no blood with aspiration and no paresthesia        Assessment:  Insertion:  Uncomplicated  Patient tolerance:  Patient tolerated the procedure well with no immediate complications

## 2019-08-23 LAB
HCT VFR BLD AUTO: 32.4 % (ref 35.8–46.3)
HGB BLD-MCNC: 10.7 G/DL (ref 11.7–15.4)

## 2019-08-23 PROCEDURE — 74011250636 HC RX REV CODE- 250/636: Performed by: ANESTHESIOLOGY

## 2019-08-23 PROCEDURE — 74011250637 HC RX REV CODE- 250/637: Performed by: OBSTETRICS & GYNECOLOGY

## 2019-08-23 PROCEDURE — 36415 COLL VENOUS BLD VENIPUNCTURE: CPT

## 2019-08-23 PROCEDURE — 65270000029 HC RM PRIVATE

## 2019-08-23 PROCEDURE — 85018 HEMOGLOBIN: CPT

## 2019-08-23 RX ORDER — SIMETHICONE 80 MG
80 TABLET,CHEWABLE ORAL
Status: DISCONTINUED | OUTPATIENT
Start: 2019-08-23 | End: 2019-08-23

## 2019-08-23 RX ORDER — DOCUSATE SODIUM 100 MG/1
100 CAPSULE, LIQUID FILLED ORAL
Status: DISCONTINUED | OUTPATIENT
Start: 2019-08-23 | End: 2019-08-24 | Stop reason: HOSPADM

## 2019-08-23 RX ORDER — SODIUM CHLORIDE 0.9 % (FLUSH) 0.9 %
5-40 SYRINGE (ML) INJECTION EVERY 8 HOURS
Status: DISCONTINUED | OUTPATIENT
Start: 2019-08-23 | End: 2019-08-23

## 2019-08-23 RX ORDER — IBUPROFEN 800 MG/1
800 TABLET ORAL
Status: DISCONTINUED | OUTPATIENT
Start: 2019-08-23 | End: 2019-08-24 | Stop reason: HOSPADM

## 2019-08-23 RX ORDER — OXYCODONE HYDROCHLORIDE 5 MG/1
10 TABLET ORAL
Status: DISCONTINUED | OUTPATIENT
Start: 2019-08-23 | End: 2019-08-24 | Stop reason: HOSPADM

## 2019-08-23 RX ORDER — SODIUM CHLORIDE, SODIUM LACTATE, POTASSIUM CHLORIDE, CALCIUM CHLORIDE 600; 310; 30; 20 MG/100ML; MG/100ML; MG/100ML; MG/100ML
100 INJECTION, SOLUTION INTRAVENOUS CONTINUOUS
Status: DISCONTINUED | OUTPATIENT
Start: 2019-08-23 | End: 2019-08-23

## 2019-08-23 RX ORDER — SIMETHICONE 80 MG
80 TABLET,CHEWABLE ORAL
Status: DISCONTINUED | OUTPATIENT
Start: 2019-08-23 | End: 2019-08-24 | Stop reason: HOSPADM

## 2019-08-23 RX ORDER — SODIUM CHLORIDE 0.9 % (FLUSH) 0.9 %
5-40 SYRINGE (ML) INJECTION AS NEEDED
Status: DISCONTINUED | OUTPATIENT
Start: 2019-08-23 | End: 2019-08-23

## 2019-08-23 RX ORDER — ONDANSETRON 4 MG/1
4 TABLET, ORALLY DISINTEGRATING ORAL
Status: DISCONTINUED | OUTPATIENT
Start: 2019-08-23 | End: 2019-08-24 | Stop reason: HOSPADM

## 2019-08-23 RX ORDER — OXYCODONE AND ACETAMINOPHEN 5; 325 MG/1; MG/1
1 TABLET ORAL
Status: DISCONTINUED | OUTPATIENT
Start: 2019-08-23 | End: 2019-08-24 | Stop reason: HOSPADM

## 2019-08-23 RX ORDER — DOCUSATE SODIUM 100 MG/1
100 CAPSULE, LIQUID FILLED ORAL 2 TIMES DAILY
Status: DISCONTINUED | OUTPATIENT
Start: 2019-08-23 | End: 2019-08-23

## 2019-08-23 RX ORDER — BETAMETHASONE VALERATE 1.2 MG/G
OINTMENT TOPICAL
Status: DISCONTINUED | OUTPATIENT
Start: 2019-08-23 | End: 2019-08-24 | Stop reason: HOSPADM

## 2019-08-23 RX ADMIN — OXYCODONE HYDROCHLORIDE AND ACETAMINOPHEN 1 TABLET: 5; 325 TABLET ORAL at 23:44

## 2019-08-23 RX ADMIN — OXYCODONE HYDROCHLORIDE AND ACETAMINOPHEN 1 TABLET: 5; 325 TABLET ORAL at 19:59

## 2019-08-23 RX ADMIN — KETOROLAC TROMETHAMINE 15 MG: 30 INJECTION, SOLUTION INTRAMUSCULAR at 02:11

## 2019-08-23 RX ADMIN — OXYCODONE HYDROCHLORIDE AND ACETAMINOPHEN 1 TABLET: 5; 325 TABLET ORAL at 15:52

## 2019-08-23 RX ADMIN — IBUPROFEN 800 MG: 800 TABLET, FILM COATED ORAL at 20:00

## 2019-08-23 RX ADMIN — BETAMETHASONE VALERATE: 1 OINTMENT TOPICAL at 14:14

## 2019-08-23 RX ADMIN — NALBUPHINE HYDROCHLORIDE 5 MG: 10 INJECTION, SOLUTION INTRAMUSCULAR; INTRAVENOUS; SUBCUTANEOUS at 07:42

## 2019-08-23 RX ADMIN — IBUPROFEN 800 MG: 800 TABLET, FILM COATED ORAL at 13:41

## 2019-08-23 RX ADMIN — KETOROLAC TROMETHAMINE 15 MG: 30 INJECTION, SOLUTION INTRAMUSCULAR at 07:42

## 2019-08-23 NOTE — LACTATION NOTE
This note was copied from a baby's chart. Lactation visit. In to check on feeds. Mom reports baby latching well. Had 2 other episodes overnight of gagging/choking and spitting up, same as during lactation visit yesterday. Mom reports baby fed well between spitting up episodes. Has been feeding on demand, some cluster feeding. Baby feeding now on left breast in cradle hold. Good latch and baby actively feeding. Mom doing well, and confident. Milk likely to come in quickly. Questions answered.

## 2019-08-23 NOTE — PROGRESS NOTES
08/23/19 0211   Pain Assessment   Pain Scale 1 Numeric (0 - 10)   Pain Intensity 1 3   Pain Location 1 Abdomen; Incisional   Pain Description 1 Aching; Sore   Pain Intervention(s) 1 Medication (see MAR)   POSS Scale   Opioid Sedation Scale 1

## 2019-08-23 NOTE — PROGRESS NOTES
08/22/19 6405   Pain Assessment   Pain Scale 1 Numeric (0 - 10)   Pain Intensity 1 0   OTHER   Pain Reassessment 1 Yes   POSS Scale   Opioid Sedation Scale 1

## 2019-08-23 NOTE — PROGRESS NOTES
Anesthesiology  Post-op Note    Post-op day 1 s/p  via spinal with neuraxial opioids for post-op pain management. Visit Vitals  /73 (BP 1 Location: Right arm, BP Patient Position: At rest)   Pulse (!) 107   Temp 37.3 °C (99.1 °F)   Resp 16   LMP 2018 (Exact Date)   SpO2 96%   Breastfeeding? Yes     Airway patent, patient appropriately hydrated and appears euvolemic. Patient is Alert and oriented. Pain is well controlled. Pruritus is well controlled. Nausea is well controlled. No complaints about back or site of injection. Motor and sensory function has returned to baseline in lower extremities. Patient is satisfied with anesthetic and reports no complications. Continue current orders, then initiate surgeon's orders for pain management 24 hours after . Follow up per surgeon.

## 2019-08-23 NOTE — PROGRESS NOTES
Post-Operative Day Number 1 Progress Note    Patient doing well post-op day 1 from  delivery without significant complaints. Pain controlled on current medication. Voiding without difficulty, normal lochia. Vitals:    Patient Vitals for the past 8 hrs:   BP Temp Pulse Resp SpO2   19 0726 124/82 98.7 °F (37.1 °C) 98 16 96 %   19 0358 122/73 99.1 °F (37.3 °C) (!) 107 16 96 %     Temp (24hrs), Av.3 °F (36.8 °C), Min:97.4 °F (36.3 °C), Max:99.1 °F (37.3 °C)      Vital signs stable, afebrile. Exam:  Patient without distress. Abdomen soft, fundus firm at level of umbilicus, non tender. Incision dry and clean without erythema. Lower extremities are negative for swelling, cords or tenderness. Lab/Data Review:  CBC:   Lab Results   Component Value Date/Time    HGB 10.7 (L) 2019 05:28 AM    HCT 32.4 (L) 2019 05:28 AM       Assessment and Plan:  Patient appears to be having uncomplicated post- course. Continue routine post-op care and maternal education.

## 2019-08-23 NOTE — PROGRESS NOTES
Chart reviewed - no needs identified.  made introduction to family and provided informational packet on  mood disorder education/resources. Patient denies any history of postpartum depression/anxiety. Family receptive to receiving information and denied any additional needs from . Family has 's contact information should any needs/questions arise.     LES Hylton-DAMION  [facility]   [phone]

## 2019-08-24 VITALS
OXYGEN SATURATION: 97 % | TEMPERATURE: 98.8 F | SYSTOLIC BLOOD PRESSURE: 108 MMHG | DIASTOLIC BLOOD PRESSURE: 71 MMHG | RESPIRATION RATE: 18 BRPM | HEART RATE: 88 BPM

## 2019-08-24 PROCEDURE — 74011250637 HC RX REV CODE- 250/637: Performed by: OBSTETRICS & GYNECOLOGY

## 2019-08-24 RX ORDER — OXYCODONE AND ACETAMINOPHEN 5; 325 MG/1; MG/1
1 TABLET ORAL
Qty: 20 TAB | Refills: 0 | Status: SHIPPED | OUTPATIENT
Start: 2019-08-24 | End: 2019-08-29

## 2019-08-24 RX ORDER — IBUPROFEN 800 MG/1
800 TABLET ORAL
Qty: 40 TAB | Refills: 1 | Status: SHIPPED | OUTPATIENT
Start: 2019-08-24 | End: 2019-10-01

## 2019-08-24 RX ADMIN — OXYCODONE HYDROCHLORIDE AND ACETAMINOPHEN 1 TABLET: 5; 325 TABLET ORAL at 09:05

## 2019-08-24 RX ADMIN — IBUPROFEN 800 MG: 800 TABLET, FILM COATED ORAL at 09:05

## 2019-08-24 RX ADMIN — OXYCODONE HYDROCHLORIDE AND ACETAMINOPHEN 1 TABLET: 5; 325 TABLET ORAL at 12:22

## 2019-08-24 RX ADMIN — IBUPROFEN 800 MG: 800 TABLET, FILM COATED ORAL at 02:04

## 2019-08-24 RX ADMIN — OXYCODONE HYDROCHLORIDE AND ACETAMINOPHEN 1 TABLET: 5; 325 TABLET ORAL at 05:22

## 2019-08-24 RX ADMIN — SIMETHICONE 80 MG: 80 TABLET, CHEWABLE ORAL at 02:10

## 2019-08-24 RX ADMIN — SIMETHICONE 80 MG: 80 TABLET, CHEWABLE ORAL at 09:05

## 2019-08-24 NOTE — LACTATION NOTE
Diet/exercise not discussed today, though pt has been counseled many times in the past to make incremental changes, ty to lower sugar intake.    This note was copied from a baby's chart. Spectra S1/2:  Power on and press wavy line button to go into let-down mode. Cycle will be 70 (and cannot be changed). Adjust vacuum to comfort. After 2 minutes, press wavy line button again to go into expression mode. Cycle should be between 54, 50 or 46. Again, adjust vacuum to comfort. Cycle indicates the number of times per minute the pump is pulling. Suggested mom start insurance pumping due to 8.5% weight loss. Encouraged to double pump after some daytime feedings for 10 minutes to help milk come in.  Give all colostrum in a syringe.

## 2019-08-24 NOTE — LACTATION NOTE

## 2019-08-24 NOTE — LACTATION NOTE
This note was copied from a baby's chart. Observed at breast in football on R.  Baby fed fairly well. Demonstrated manual lip flange. Encouraged frequent feeding and watch output. Discussed baby looks great, but weight loss at 8.5%. Suggested mom do some insurance pumping with her pump once home. Provided with syringes.

## 2019-08-24 NOTE — PROGRESS NOTES
08/24/19 0204   Pain Assessment   Pain Scale 1 Numeric (0 - 10)   Pain Intensity 1 2   Pain Location 1 Abdomen; Incisional   Pain Orientation 1 Anterior; Lower   Pain Description 1 Aching; Sore   Pain Intervention(s) 1 Medication (see MAR)     Motrin 800mg given for pain.

## 2019-08-24 NOTE — PROGRESS NOTES
08/23/19 0657   Pain Assessment   Pain Scale 1 Numeric (0 - 10)   Pain Intensity 1 4   Pain Location 1 Abdomen; Incisional   Pain Orientation 1 Anterior; Lower   Pain Description 1 Sore;Aching   Pain Intervention(s) 1 Medication (see MAR)     Percocet 5-325mg 1 tab given for pain.

## 2019-08-24 NOTE — DISCHARGE INSTRUCTIONS
Patient Education   Discharge instruction to follow: Activity: Pelvis rest for 6 weeks, nothing in the vagina     No heavy lifting over 15 lbs for 6 weeks     No driving for 2 weeks     No push/pull motion such as sweeping or vacuuming or strenuous exercise for 6 weeks     No tub baths for 6 weeks     section keep incision clean and dry, may shower as normal with soap and water. Inspect incision every day for signs of infection listed below. Continue to use girish-bottle with every void or bowel movement until comfortable stopping. Change sanitary pad after each urination or bowel movement. Call MD for the following:      Fever over 100.4 F; pain not relieved by medication; foul smelling vaginal discharge or increase in vaginal bleeding. Redness, swelling, or drainage from  incision. Take medication as prescribed. Follow up with MD as order. After Your Delivery (the Postpartum Period): Care Instructions  Your Care Instructions    Congratulations on the birth of your baby. Like pregnancy, the  period can be a time of excitement, jesus, and exhaustion. You may look at your wondrous little baby and feel happy. You may also be overwhelmed by your new sleep hours and new responsibilities. At first, babies often sleep during the days and are awake at night. They do not have a pattern or routine. They may make sudden gasps, jerk themselves awake, or look like they have crossed eyes. These are all normal, and they may even make you smile. In these first weeks after delivery, try to take good care of yourself. It may take 4 to 6 weeks to feel like yourself again, and possibly longer if you had a  birth. You will likely feel very tired for several weeks. Your days will be full of ups and downs, but lots of jesus as well. Follow-up care is a key part of your treatment and safety. Be sure to make and go to all appointments, and call your doctor if you are having problems.  It's also a good idea to know your test results and keep a list of the medicines you take. How can you care for yourself at home? Take care of your body after delivery  · Use pads instead of tampons for the bloody flow that may last as long as 2 weeks. · Ease cramps with ibuprofen (Advil, Motrin). · Ease soreness of hemorrhoids and the area between your vagina and rectum with ice compresses or witch hazel pads. · Ease constipation by drinking lots of fluid and eating high-fiber foods. Ask your doctor about over-the-counter stool softeners. · Cleanse yourself with a gentle squeeze of warm water from a bottle instead of wiping with toilet paper. · Take a sitz bath in warm water several times a day. · Wear a good nursing bra. Ease sore and swollen breasts with warm, wet washcloths. · If you are not breastfeeding, use ice rather than heat for breast soreness. · Your period may not start for several months if you are breastfeeding. You may bleed more, and longer at first, than you did before you got pregnant. · Wait until you are healed (about 4 to 6 weeks) before you have sexual intercourse. Your doctor will tell you when it is okay to have sex. · Try not to travel with your baby for 5 or 6 weeks. If you take a long car trip, make frequent stops to walk around and stretch. Avoid exhaustion  · Rest every day. Try to nap when your baby naps. · Ask another adult to be with you for a few days after delivery. · Plan for  if you have other children. · Stay flexible so you can eat at odd hours and sleep when you need to. Both you and your baby are making new schedules. · Plan small trips to get out of the house. Change can make you feel less tired. · Ask for help with housework, cooking, and shopping. Remind yourself that your job is to care for your baby. Know about help for postpartum depression  · \"Baby blues\" are common for the first 1 to 2 weeks after birth.  You may cry or feel sad or irritable for no reason. · Rest whenever you can. Being tired makes it harder to handle your emotions. · Go for walks with your baby. · Talk to your partner, friends, and family about your feelings. · If your symptoms last for more than a few weeks, or if you feel very depressed, ask your doctor for help. · Postpartum depression can be treated. Support groups and counseling can help. Sometimes medicine can also help. Stay healthy  · Eat healthy foods so you have more energy and lose extra baby pounds. · If you breastfeed, avoid drugs. If you quit smoking during pregnancy, try to stay smoke-free. If you choose to have a drink now and then, have only one drink, and limit the number of occasions that you have a drink. Wait to breastfeed at least 2 hours after you have a drink to reduce the amount of alcohol the baby may get in the milk. · Start daily exercise after 4 to 6 weeks, but rest when you feel tired. · Learn exercises to tone your belly. Do Kegel exercises to regain strength in your pelvic muscles. You can do these exercises while you stand or sit. ? Squeeze the same muscles you would use to stop your urine. Your belly and thighs should not move. ? Hold the squeeze for 3 seconds, and then relax for 3 seconds. ? Start with 3 seconds. Then add 1 second each week until you are able to squeeze for 10 seconds. ? Repeat the exercise 10 to 15 times for each session. Do three or more sessions each day. · Find a class for new mothers and new babies that has an exercise time. · If you had a  birth, give yourself a bit more time before you exercise, and be careful. When should you call for help? [id number] anytime you think you may need emergency care.  For example, call if:    · You have thoughts of harming yourself, your baby, or another person.     · You passed out (lost consciousness).     · You have chest pain, are short of breath, or cough up blood.     · You have a seizure.    Call your doctor now or seek immediate medical care if:    · You have severe vaginal bleeding. This means you are passing blood clots and soaking through a pad each hour for 2 or more hours.     · You are dizzy or lightheaded, or you feel like you may faint.     · You have a fever.     · You have new or more belly pain.     · You have signs of a blood clot in your leg (called a deep vein thrombosis), such as:  ? Pain in the calf, back of the knee, thigh, or groin. ? Redness and swelling in your leg or groin.     · You have signs of preeclampsia, such as:  ? Sudden swelling of your face, hands, or feet. ? New vision problems (such as dimness, blurring, or seeing spots). ? A severe headache.    Watch closely for changes in your health, and be sure to contact your doctor if:    · Your vaginal bleeding seems to be getting heavier.     · You have new or worse vaginal discharge.     · You feel sad, anxious, or hopeless for more than a few days.     · You do not get better as expected.

## 2019-08-24 NOTE — PROGRESS NOTES
08/24/19 0524   Pain Assessment   Pain Scale 1 Numeric (0 - 10)   Pain Intensity 1 4   Pain Location 1 Abdomen; Incisional   Pain Orientation 1 Anterior; Lower   Pain Description 1 Sore   Pain Intervention(s) 1 Medication (see MAR)     Percocet 5-325mg 1 tab given for pain.

## 2019-08-24 NOTE — PROGRESS NOTES
08/23/19 2000   Pain 1   Pain Scale 1 Numeric (0 - 10)   Pain Intensity 1 4   Pain Location 1 Abdomen; Incisional   Pain Orientation 1 Lower; Anterior   Pain Description 1 Aching; Sore   Pain Intervention(s) 1 Medication (see MAR)     Motrin 800mg and Percocet 5-325mg given for pain per pt request. Educated to call out if medication does not help.

## 2019-08-24 NOTE — DISCHARGE SUMMARY
[address] [facility] Discharge Summary     Patient ID:  Ada Dorsey  [id number]  81 y.o.  1990    Admit date: 2019    Discharge date and time: No discharge date for patient encounter. Admitting Physician: Angel Luis Capps MD     Discharge Physician: Heather Philippe M.D. Admission Diagnoses: H/O  section [Z98.891];39 weeks gestation of pregnancy [Z3A.39]    Problem List: Hospital - Principal Problem:    H/O  section (2019)      Overview: Pt considering TOLAC. Aware of TOLAC protocol, her pre-pregnancy BMI is       34.58. Active Problems:    39 weeks gestation of pregnancy (2019)     ; Other -   Patient Active Problem List   Diagnosis Code    Gallbladder calculus without cholecystitis K80.20    Malignant melanoma of left lower extremity including hip (HCC) C43.72    Pregnancy Z34.90    History of marijuana use Z87.898    H/O  section Z98.891    Velamentous insertion of umbilical cord O70.046    Cholecystitis, unspecified K81.9    39 weeks gestation of pregnancy Z3A.39        Discharge Diagnoses: H/O  section [Z98.891];39 weeks gestation of pregnancy [Z3A.39]    Hospital Course: Ada Dorsey had unremarkeable progressive recovery. and Eating, ambulating, and voiding in a routine manner. Significant Diagnostic Studies: No results found for this or any previous visit (from the past 24 hour(s)). Procedures: repeat  section, low transverse incision    Discharge Exam:  Visit Vitals  /71 (BP 1 Location: Right arm, BP Patient Position: At rest)   Pulse 88   Temp 98.8 °F (37.1 °C)   Resp 18   LMP 2018 (Exact Date)   SpO2 97%   Breastfeeding? Yes        Heart: regular rate and rhythm, S1, S2 normal, no murmur, click, rub or gallop  Lungs:clear to auscultation bilaterally  Extremities: normal, atraumatic, no cyanosis or edema.  No DVT  Incision/episiotomy: no significant drainage, no dehiscence, no significant erythema    Patient Instructions:   Current Discharge Medication List      START taking these medications    Details   ibuprofen (MOTRIN) 800 mg tablet Take 1 Tab by mouth every eight (8) hours as needed for Pain. Qty: 40 Tab, Refills: 1      oxyCODONE-acetaminophen (PERCOCET) 5-325 mg per tablet Take 1 Tab by mouth every four (4) hours as needed for Pain for up to 5 days. Max Daily Amount: 6 Tabs. Indications: pain  Qty: 20 Tab, Refills: 0    Associated Diagnoses: H/O  section         CONTINUE these medications which have NOT CHANGED    Details   docusate sodium (COLACE) 100 mg capsule Take 100 mg by mouth two (2) times a day. raNITIdine (ZANTAC) 300 mg tab Take  by mouth as needed. PNV COMBO#47/IRON/FA #1/DHA (PNV-DHA PO) Take 1 Tab by mouth daily. Activity: physical activity is restricted per discharge instructions  Diet: resume normal diet  Wound Care: Keep wound clean and dry, as directed    Follow-up with Dr Keyla Guzman in 2 weeks.     Signed:  Sean London MD  2019  11:00 AM

## 2020-01-10 PROBLEM — Z34.90 PREGNANCY: Status: RESOLVED | Noted: 2019-01-16 | Resolved: 2020-01-10

## 2020-01-10 PROBLEM — O43.129 VELAMENTOUS INSERTION OF UMBILICAL CORD: Status: RESOLVED | Noted: 2019-01-16 | Resolved: 2020-01-10

## 2020-01-10 PROBLEM — Z3A.39 39 WEEKS GESTATION OF PREGNANCY: Status: RESOLVED | Noted: 2019-08-22 | Resolved: 2020-01-10

## 2021-09-20 PROBLEM — F12.91 HISTORY OF MARIJUANA USE: Status: RESOLVED | Noted: 2019-01-16 | Resolved: 2021-09-20

## 2021-09-20 PROBLEM — Q99.9 CONGENITAL DISORDER DUE TO ABNORMALITY OF CHROMOSOME NUMBER OR STRUCTURE: Status: ACTIVE | Noted: 2021-09-20

## 2021-09-20 PROBLEM — R00.0 TACHYCARDIA: Status: ACTIVE | Noted: 2021-09-20

## 2021-09-20 PROBLEM — Z82.79 FAMILY HISTORY OF CONGENITAL HEART DEFECT: Status: ACTIVE | Noted: 2021-09-20

## 2021-09-28 PROBLEM — R01.1 MURMUR: Status: ACTIVE | Noted: 2021-09-28

## 2021-12-07 PROBLEM — O09.92 HIGH-RISK PREGNANCY, SECOND TRIMESTER: Status: ACTIVE | Noted: 2019-01-16

## 2021-12-07 PROBLEM — O99.412 MATERNAL ARRHYTHMIA COMPLICATING PREGNANCY IN SECOND TRIMESTER: Status: ACTIVE | Noted: 2021-09-20

## 2021-12-07 PROBLEM — K81.9 CHOLECYSTITIS, UNSPECIFIED: Status: RESOLVED | Noted: 2019-04-10 | Resolved: 2021-12-07

## 2021-12-07 PROBLEM — I49.9 MATERNAL ARRHYTHMIA COMPLICATING PREGNANCY IN SECOND TRIMESTER: Status: ACTIVE | Noted: 2021-09-20

## 2021-12-07 PROBLEM — Z86.79 PERSONAL HISTORY OF CARDIAC MURMUR: Status: ACTIVE | Noted: 2021-09-28

## 2021-12-07 PROBLEM — C43.72 MALIGNANT MELANOMA OF LEFT LOWER EXTREMITY INCLUDING HIP (HCC): Status: RESOLVED | Noted: 2017-12-07 | Resolved: 2021-12-07

## 2022-01-11 PROBLEM — U07.1 COVID-19: Status: ACTIVE | Noted: 2022-01-11

## 2022-01-11 PROBLEM — O98.512 COVID-19 AFFECTING PREGNANCY IN SECOND TRIMESTER: Status: ACTIVE | Noted: 2022-01-11

## 2022-03-18 PROBLEM — I49.9 MATERNAL ARRHYTHMIA COMPLICATING PREGNANCY IN SECOND TRIMESTER: Status: ACTIVE | Noted: 2021-09-20

## 2022-03-18 PROBLEM — O99.412 MATERNAL ARRHYTHMIA COMPLICATING PREGNANCY IN SECOND TRIMESTER: Status: ACTIVE | Noted: 2021-09-20

## 2022-03-19 PROBLEM — Z86.79 PERSONAL HISTORY OF CARDIAC MURMUR: Status: ACTIVE | Noted: 2021-09-28

## 2022-03-19 PROBLEM — Z98.891 H/O CESAREAN SECTION: Status: ACTIVE | Noted: 2019-01-16

## 2022-03-19 PROBLEM — U07.1 COVID-19 AFFECTING PREGNANCY IN SECOND TRIMESTER: Status: ACTIVE | Noted: 2022-01-11

## 2022-03-19 PROBLEM — Z82.79 FAMILY HISTORY OF CONGENITAL HEART DEFECT: Status: ACTIVE | Noted: 2021-09-20

## 2022-03-19 PROBLEM — Q99.9 CONGENITAL DISORDER DUE TO ABNORMALITY OF CHROMOSOME NUMBER OR STRUCTURE: Status: ACTIVE | Noted: 2021-09-20

## 2022-03-19 PROBLEM — O98.512 COVID-19 AFFECTING PREGNANCY IN SECOND TRIMESTER: Status: ACTIVE | Noted: 2022-01-11

## 2022-03-20 PROBLEM — O09.92 HIGH-RISK PREGNANCY, SECOND TRIMESTER: Status: ACTIVE | Noted: 2019-01-16

## 2022-03-31 PROBLEM — Q99.9 NONSPECIFIC ABNORMAL FINDINGS ON CHROMOSOMAL ANALYSIS: Status: ACTIVE | Noted: 2020-06-11

## 2022-03-31 PROBLEM — R93.1 ABNORMAL ECHOCARDIOGRAM: Status: ACTIVE | Noted: 2020-07-07

## 2022-04-21 ENCOUNTER — ANESTHESIA EVENT (OUTPATIENT)
Dept: LABOR AND DELIVERY | Age: 32
End: 2022-04-21
Payer: COMMERCIAL

## 2022-04-22 ENCOUNTER — ANESTHESIA (OUTPATIENT)
Dept: LABOR AND DELIVERY | Age: 32
End: 2022-04-22
Payer: COMMERCIAL

## 2022-04-22 ENCOUNTER — HOSPITAL ENCOUNTER (INPATIENT)
Age: 32
LOS: 2 days | Discharge: HOME | End: 2022-04-24
Attending: OBSTETRICS & GYNECOLOGY | Admitting: OBSTETRICS & GYNECOLOGY
Payer: COMMERCIAL

## 2022-04-22 DIAGNOSIS — Z98.891 PREVIOUS CESAREAN SECTION: ICD-10-CM

## 2022-04-22 DIAGNOSIS — Z98.891 H/O CESAREAN SECTION: ICD-10-CM

## 2022-04-22 LAB
ABO + RH BLD: NORMAL
BASE DEFICIT BLD-SCNC: 0.8 MMOL/L
BASE DEFICIT BLD-SCNC: 2 MMOL/L
BLOOD GROUP ANTIBODIES SERPL: NORMAL
ERYTHROCYTE [DISTWIDTH] IN BLOOD BY AUTOMATED COUNT: 13.3 % (ref 11.9–14.6)
HCO3 BLD-SCNC: 25.9 MMOL/L (ref 22–26)
HCO3 BLDV-SCNC: 23 MMOL/L (ref 23–28)
HCT VFR BLD AUTO: 36 % (ref 35.8–46.3)
HGB BLD-MCNC: 12.7 G/DL (ref 11.7–15.4)
MCH RBC QN AUTO: 31.1 PG (ref 26.1–32.9)
MCHC RBC AUTO-ENTMCNC: 35.3 G/DL (ref 31.4–35)
MCV RBC AUTO: 88 FL (ref 79.6–97.8)
NRBC # BLD: 0 K/UL (ref 0–0.2)
PCO2 BLDCO: 40 MMHG (ref 32–68)
PCO2 BLDCO: 50 MMHG (ref 32–68)
PH BLDCO: 7.32 [PH] (ref 7.15–7.38)
PH BLDCO: 7.37 [PH] (ref 7.15–7.38)
PLATELET # BLD AUTO: 169 K/UL (ref 150–450)
PMV BLD AUTO: 11.1 FL (ref 9.4–12.3)
PO2 BLDCO: 16 MMHG
PO2 BLDCO: 32 MMHG
RBC # BLD AUTO: 4.09 M/UL (ref 4.05–5.2)
SAO2 % BLD: 19.3 % (ref 95–98)
SAO2 % BLDV: 59.4 % (ref 65–88)
SERVICE CMNT-IMP: ABNORMAL
SERVICE CMNT-IMP: ABNORMAL
SPECIMEN EXP DATE BLD: NORMAL
SPECIMEN TYPE: ABNORMAL
SPECIMEN TYPE: ABNORMAL
WBC # BLD AUTO: 11.6 K/UL (ref 4.3–11.1)

## 2022-04-22 PROCEDURE — 77030034696 HC CATH URETH FOL 2W BARD -A: Performed by: OBSTETRICS & GYNECOLOGY

## 2022-04-22 PROCEDURE — 74011250636 HC RX REV CODE- 250/636: Performed by: REGISTERED NURSE

## 2022-04-22 PROCEDURE — 75410000002 HC LABOR FEE PER 1 HR

## 2022-04-22 PROCEDURE — 77030031139 HC SUT VCRL2 J&J -A: Performed by: OBSTETRICS & GYNECOLOGY

## 2022-04-22 PROCEDURE — 74011000250 HC RX REV CODE- 250: Performed by: ANESTHESIOLOGY

## 2022-04-22 PROCEDURE — 85027 COMPLETE CBC AUTOMATED: CPT

## 2022-04-22 PROCEDURE — 77030002888 HC SUT CHRMC J&J -A: Performed by: OBSTETRICS & GYNECOLOGY

## 2022-04-22 PROCEDURE — 74011250637 HC RX REV CODE- 250/637: Performed by: ANESTHESIOLOGY

## 2022-04-22 PROCEDURE — 77030032490 HC SLV COMPR SCD KNE COVD -B: Performed by: OBSTETRICS & GYNECOLOGY

## 2022-04-22 PROCEDURE — 75410000003 HC RECOV DEL/VAG/CSECN EA 0.5 HR: Performed by: OBSTETRICS & GYNECOLOGY

## 2022-04-22 PROCEDURE — 77030005518 HC CATH URETH FOL 2W BARD -B: Performed by: OBSTETRICS & GYNECOLOGY

## 2022-04-22 PROCEDURE — 77030007880 HC KT SPN EPDRL BBMI -B: Performed by: ANESTHESIOLOGY

## 2022-04-22 PROCEDURE — 74011250636 HC RX REV CODE- 250/636: Performed by: ANESTHESIOLOGY

## 2022-04-22 PROCEDURE — 75410000003 HC RECOV DEL/VAG/CSECN EA 0.5 HR

## 2022-04-22 PROCEDURE — 2709999900 HC NON-CHARGEABLE SUPPLY: Performed by: OBSTETRICS & GYNECOLOGY

## 2022-04-22 PROCEDURE — 76010000391 HC C SECN FIRST 1 HR: Performed by: OBSTETRICS & GYNECOLOGY

## 2022-04-22 PROCEDURE — 59510 CESAREAN DELIVERY: CPT | Performed by: OBSTETRICS & GYNECOLOGY

## 2022-04-22 PROCEDURE — 76010000392 HC C SECN EA ADDL 0.5 HR: Performed by: OBSTETRICS & GYNECOLOGY

## 2022-04-22 PROCEDURE — 77030018836 HC SOL IRR NACL ICUM -A: Performed by: OBSTETRICS & GYNECOLOGY

## 2022-04-22 PROCEDURE — 2709999900 HC NON-CHARGEABLE SUPPLY

## 2022-04-22 PROCEDURE — 10D17Z9 MANUAL EXTRACTION OF PRODUCTS OF CONCEPTION, RETAINED, VIA NATURAL OR ARTIFICIAL OPENING: ICD-10-PCS | Performed by: OBSTETRICS & GYNECOLOGY

## 2022-04-22 PROCEDURE — 65270000029 HC RM PRIVATE

## 2022-04-22 PROCEDURE — 74011250636 HC RX REV CODE- 250/636: Performed by: OBSTETRICS & GYNECOLOGY

## 2022-04-22 PROCEDURE — 77030003665 HC NDL SPN BBMI -A: Performed by: ANESTHESIOLOGY

## 2022-04-22 PROCEDURE — 86900 BLOOD TYPING SEROLOGIC ABO: CPT

## 2022-04-22 PROCEDURE — 76060000078 HC EPIDURAL ANESTHESIA: Performed by: OBSTETRICS & GYNECOLOGY

## 2022-04-22 PROCEDURE — 59514 CESAREAN DELIVERY ONLY: CPT | Performed by: OBSTETRICS & GYNECOLOGY

## 2022-04-22 PROCEDURE — 82803 BLOOD GASES ANY COMBINATION: CPT

## 2022-04-22 RX ORDER — SODIUM CHLORIDE 0.9 % (FLUSH) 0.9 %
5-40 SYRINGE (ML) INJECTION EVERY 8 HOURS
Status: DISCONTINUED | OUTPATIENT
Start: 2022-04-22 | End: 2022-04-22 | Stop reason: HOSPADM

## 2022-04-22 RX ORDER — KETOROLAC TROMETHAMINE 30 MG/ML
30 INJECTION, SOLUTION INTRAMUSCULAR; INTRAVENOUS
Status: DISCONTINUED | OUTPATIENT
Start: 2022-04-22 | End: 2022-04-23

## 2022-04-22 RX ORDER — SODIUM CHLORIDE, SODIUM LACTATE, POTASSIUM CHLORIDE, CALCIUM CHLORIDE 600; 310; 30; 20 MG/100ML; MG/100ML; MG/100ML; MG/100ML
100 INJECTION, SOLUTION INTRAVENOUS CONTINUOUS
Status: DISCONTINUED | OUTPATIENT
Start: 2022-04-22 | End: 2022-04-22 | Stop reason: HOSPADM

## 2022-04-22 RX ORDER — ONDANSETRON 2 MG/ML
4 INJECTION INTRAMUSCULAR; INTRAVENOUS
Status: DISCONTINUED | OUTPATIENT
Start: 2022-04-22 | End: 2022-04-23

## 2022-04-22 RX ORDER — BUPIVACAINE HYDROCHLORIDE 7.5 MG/ML
INJECTION, SOLUTION INTRASPINAL
Status: COMPLETED | OUTPATIENT
Start: 2022-04-22 | End: 2022-04-22

## 2022-04-22 RX ORDER — SODIUM CHLORIDE 0.9 % (FLUSH) 0.9 %
5-40 SYRINGE (ML) INJECTION AS NEEDED
Status: DISCONTINUED | OUTPATIENT
Start: 2022-04-22 | End: 2022-04-23

## 2022-04-22 RX ORDER — OXYTOCIN/RINGER'S LACTATE 30/500 ML
10 PLASTIC BAG, INJECTION (ML) INTRAVENOUS AS NEEDED
Status: DISCONTINUED | OUTPATIENT
Start: 2022-04-22 | End: 2022-04-22

## 2022-04-22 RX ORDER — NALBUPHINE HYDROCHLORIDE 10 MG/ML
5 INJECTION, SOLUTION INTRAMUSCULAR; INTRAVENOUS; SUBCUTANEOUS
Status: DISCONTINUED | OUTPATIENT
Start: 2022-04-22 | End: 2022-04-23

## 2022-04-22 RX ORDER — ONDANSETRON 2 MG/ML
INJECTION INTRAMUSCULAR; INTRAVENOUS AS NEEDED
Status: DISCONTINUED | OUTPATIENT
Start: 2022-04-22 | End: 2022-04-22 | Stop reason: HOSPADM

## 2022-04-22 RX ORDER — MORPHINE SULFATE 10 MG/ML
5 INJECTION, SOLUTION INTRAMUSCULAR; INTRAVENOUS
Status: DISCONTINUED | OUTPATIENT
Start: 2022-04-22 | End: 2022-04-23

## 2022-04-22 RX ORDER — CEFAZOLIN SODIUM/WATER 2 G/20 ML
2 SYRINGE (ML) INTRAVENOUS ONCE
Status: COMPLETED | OUTPATIENT
Start: 2022-04-22 | End: 2022-04-22

## 2022-04-22 RX ORDER — SIMETHICONE 80 MG
80 TABLET,CHEWABLE ORAL
Status: DISCONTINUED | OUTPATIENT
Start: 2022-04-23 | End: 2022-04-23

## 2022-04-22 RX ORDER — OXYCODONE HYDROCHLORIDE 5 MG/1
10 TABLET ORAL
Status: DISCONTINUED | OUTPATIENT
Start: 2022-04-22 | End: 2022-04-23

## 2022-04-22 RX ORDER — NALOXONE HYDROCHLORIDE 0.4 MG/ML
0.2 INJECTION, SOLUTION INTRAMUSCULAR; INTRAVENOUS; SUBCUTANEOUS
Status: DISCONTINUED | OUTPATIENT
Start: 2022-04-22 | End: 2022-04-23

## 2022-04-22 RX ORDER — SODIUM CHLORIDE, SODIUM LACTATE, POTASSIUM CHLORIDE, CALCIUM CHLORIDE 600; 310; 30; 20 MG/100ML; MG/100ML; MG/100ML; MG/100ML
INJECTION, SOLUTION INTRAVENOUS
Status: DISCONTINUED | OUTPATIENT
Start: 2022-04-22 | End: 2022-04-22 | Stop reason: HOSPADM

## 2022-04-22 RX ORDER — SODIUM CHLORIDE, SODIUM LACTATE, POTASSIUM CHLORIDE, CALCIUM CHLORIDE 600; 310; 30; 20 MG/100ML; MG/100ML; MG/100ML; MG/100ML
150 INJECTION, SOLUTION INTRAVENOUS CONTINUOUS
Status: DISCONTINUED | OUTPATIENT
Start: 2022-04-22 | End: 2022-04-23

## 2022-04-22 RX ORDER — SODIUM CHLORIDE 0.9 % (FLUSH) 0.9 %
5-40 SYRINGE (ML) INJECTION EVERY 8 HOURS
Status: DISCONTINUED | OUTPATIENT
Start: 2022-04-22 | End: 2022-04-23

## 2022-04-22 RX ORDER — TRISODIUM CITRATE DIHYDRATE AND CITRIC ACID MONOHYDRATE 500; 334 MG/5ML; MG/5ML
30 SOLUTION ORAL ONCE
Status: COMPLETED | OUTPATIENT
Start: 2022-04-22 | End: 2022-04-22

## 2022-04-22 RX ORDER — OXYTOCIN/RINGER'S LACTATE 30/500 ML
87.3 PLASTIC BAG, INJECTION (ML) INTRAVENOUS AS NEEDED
Status: DISCONTINUED | OUTPATIENT
Start: 2022-04-22 | End: 2022-04-22

## 2022-04-22 RX ORDER — OXYTOCIN/RINGER'S LACTATE 30/500 ML
PLASTIC BAG, INJECTION (ML) INTRAVENOUS
Status: DISCONTINUED | OUTPATIENT
Start: 2022-04-22 | End: 2022-04-22 | Stop reason: HOSPADM

## 2022-04-22 RX ORDER — MORPHINE SULFATE 0.5 MG/ML
INJECTION, SOLUTION EPIDURAL; INTRATHECAL; INTRAVENOUS
Status: COMPLETED | OUTPATIENT
Start: 2022-04-22 | End: 2022-04-22

## 2022-04-22 RX ORDER — KETOROLAC TROMETHAMINE 30 MG/ML
INJECTION, SOLUTION INTRAMUSCULAR; INTRAVENOUS AS NEEDED
Status: DISCONTINUED | OUTPATIENT
Start: 2022-04-22 | End: 2022-04-22 | Stop reason: HOSPADM

## 2022-04-22 RX ORDER — SODIUM CHLORIDE 0.9 % (FLUSH) 0.9 %
5-40 SYRINGE (ML) INJECTION AS NEEDED
Status: DISCONTINUED | OUTPATIENT
Start: 2022-04-22 | End: 2022-04-22 | Stop reason: HOSPADM

## 2022-04-22 RX ORDER — DOCUSATE SODIUM 100 MG/1
100 CAPSULE, LIQUID FILLED ORAL 2 TIMES DAILY
Status: DISCONTINUED | OUTPATIENT
Start: 2022-04-23 | End: 2022-04-23

## 2022-04-22 RX ADMIN — MORPHINE SULFATE 0.15 MG: 0.5 INJECTION, SOLUTION EPIDURAL; INTRATHECAL; INTRAVENOUS at 09:59

## 2022-04-22 RX ADMIN — ONDANSETRON 4 MG: 2 INJECTION INTRAMUSCULAR; INTRAVENOUS at 10:24

## 2022-04-22 RX ADMIN — KETOROLAC TROMETHAMINE 30 MG: 30 INJECTION, SOLUTION INTRAMUSCULAR at 16:43

## 2022-04-22 RX ADMIN — SODIUM CHLORIDE, SODIUM LACTATE, POTASSIUM CHLORIDE, AND CALCIUM CHLORIDE 1000 ML/HR: 600; 310; 30; 20 INJECTION, SOLUTION INTRAVENOUS at 08:26

## 2022-04-22 RX ADMIN — FAMOTIDINE 20 MG: 10 INJECTION, SOLUTION INTRAVENOUS at 09:05

## 2022-04-22 RX ADMIN — SODIUM CHLORIDE, SODIUM LACTATE, POTASSIUM CHLORIDE, AND CALCIUM CHLORIDE 100 ML/HR: 600; 310; 30; 20 INJECTION, SOLUTION INTRAVENOUS at 08:49

## 2022-04-22 RX ADMIN — KETOROLAC TROMETHAMINE 30 MG: 30 INJECTION, SOLUTION INTRAMUSCULAR at 22:32

## 2022-04-22 RX ADMIN — NALBUPHINE HYDROCHLORIDE 5 MG: 10 INJECTION, SOLUTION INTRAMUSCULAR; INTRAVENOUS; SUBCUTANEOUS at 18:11

## 2022-04-22 RX ADMIN — BUPIVACAINE HYDROCHLORIDE IN DEXTROSE 13.5 MG: 7.5 INJECTION, SOLUTION SUBARACHNOID at 09:59

## 2022-04-22 RX ADMIN — Medication 35 MCG/MIN: at 09:59

## 2022-04-22 RX ADMIN — SODIUM CHLORIDE, SODIUM LACTATE, POTASSIUM CHLORIDE, AND CALCIUM CHLORIDE 150 ML/HR: 600; 310; 30; 20 INJECTION, SOLUTION INTRAVENOUS at 15:32

## 2022-04-22 RX ADMIN — SODIUM CITRATE AND CITRIC ACID MONOHYDRATE 30 ML: 500; 334 SOLUTION ORAL at 09:05

## 2022-04-22 RX ADMIN — Medication 2 G: at 09:38

## 2022-04-22 RX ADMIN — SODIUM CHLORIDE, SODIUM LACTATE, POTASSIUM CHLORIDE, AND CALCIUM CHLORIDE: 600; 310; 30; 20 INJECTION, SOLUTION INTRAVENOUS at 10:50

## 2022-04-22 RX ADMIN — SODIUM CHLORIDE, SODIUM LACTATE, POTASSIUM CHLORIDE, AND CALCIUM CHLORIDE: 600; 310; 30; 20 INJECTION, SOLUTION INTRAVENOUS at 09:48

## 2022-04-22 RX ADMIN — NALBUPHINE HYDROCHLORIDE 5 MG: 10 INJECTION, SOLUTION INTRAMUSCULAR; INTRAVENOUS; SUBCUTANEOUS at 12:01

## 2022-04-22 RX ADMIN — Medication 500 ML/HR: at 10:19

## 2022-04-22 RX ADMIN — KETOROLAC TROMETHAMINE 30 MG: 30 INJECTION, SOLUTION INTRAMUSCULAR; INTRAVENOUS at 10:42

## 2022-04-22 NOTE — PROGRESS NOTES
SBAR IN Report: Mother    Verbal report received from Rachael on this patient, who is now being transferred from L&D for routine progression of care. The patient is wearing a green \"Anesthesia-Duramorph\" band. Report consisted of patient's Situation, Background, Assessment and Recommendations (SBAR).  ID bands were compared with the identification form, and verified with the patient and transferring nurse. Information from the SBAR, OR Summary, Procedure Summary, Intake/Output, MAR and Recent Results and the Caleb Report was reviewed with the transferring nurse; opportunity for questions and clarification provided.

## 2022-04-22 NOTE — PROGRESS NOTES
Admission assessment complete as noted. Patient oriented to room and unit. Plan of care reviewed and patient verbalizes understanding. Questions encouraged and answered. Patent encouraged to call for needs or concerns. Safety Teaching reviewed:   1. Hand hygiene prior to handling the infant. 2. Use of bulb syringe. 3. Bracelets with matching numbers are placed on mother and infant  4. An infant security tag  [facility]) is placed on the infant's ankle and monitored  5. All OB nurses wear pink Employee badges - do not give your baby to anyone without proper identification. 6. Never leave the baby alone in the room. 7. The infant should be placed on their back to sleep. on a firm mattress. No toys should be placed in the crib. (safe sleep video offered to view)  8. Never shake the baby (video offered to view)  9. Infant fall prevention - do not sleep with the baby, and place the baby in the crib while ambulating. 8. Mother and Baby Care booklet given to Mother.

## 2022-04-22 NOTE — L&D DELIVERY NOTE
Delivery Summary    Patient: Georges Guzman MRN: [id number]  SSN: [id number]    YOB: 1990  Age: 33 y.o. Sex: female        Information for the patient's :  Ca Clark [[id number]]       Labor Events:    Labor: No    Steroids: None   Cervical Ripening Date/Time:       Cervical Ripening Type: None   Antibiotics During Labor: No   Rupture Identifier: Sac 1    Rupture Date/Time: 2022 10:17 AM   Rupture Type: AROM   Amniotic Fluid Volume: Moderate    Amniotic Fluid Description: Clear    Amniotic Fluid Odor: None    Induction: None       Induction Date/Time:        Indications for Induction:      Augmentation: None   Augmentation Date/Time:      Indications for Augmentation:     Labor complications: None       Additional complications:        Delivery Events:  Indications For Episiotomy:     Episiotomy: None   Perineal Laceration(s): None   Repaired:     Periurethral Laceration Location:      Repaired:     Labial Laceration Location:     Repaired:     Sulcal Laceration Location:     Repaired:     Vaginal Laceration Location:     Repaired:     Cervical Laceration Location:     Repaired:     Repair Suture: None   Number of Repair Packets:     Estimated Blood Loss (ml):  ml   Quantitaive Blood Loss (ml):             Delivery Date: 2022    Delivery Time: 10:18 AM   Delivery Type: , Low Transverse     Details    Trial of Labor: No   Primary/Repeat: Repeat   Priority: Routine   Indications:  Prior Uterine Surgery       Sex:  Male     Gestational Age: 43w3d  Delivery Clinician:  Shayna Zuniga  Living Status: Living   Delivery Location: OR            APGARS  One minute Five minutes Ten minutes   Skin color: 1   1        Heart rate: 2   2        Grimace: 2   2        Muscle tone: 2   2        Breathin   2        Totals: 9   9          Presentation: Vertex    Position:        Resuscitation Method:  Suctioning-bulb; Tactile Stimulation Meconium Stained:        Cord Information: 3 Vessels  Complications: None  Cord around:    Delayed cord clamping? Yes  Cord clamped date/time:2022 10:19 AM  Disposition of Cord Blood: Lab    Blood Gases Sent?:      Placenta:  Date/Time: 2022 10:20 AM  Removal: Manual Removal      Appearance: Normal      Measurements:  Birth Weight: 6 lb 11.6 oz (3.05 kg)      Birth Length: 1' 7.88\" (0.505 m)      Head Circumference: 1' 1.39\" (0.34 m)      Chest Circumference: 1' 0.4\" (0.315 m)     Abdominal Girth:       Other Providers:   Matt EDUARDO;SAW RM;MARTELL BONILLA;ROSELINE LUZ;ASHISH CLAY;VLADIMIR THORNTON;FIDEL MELGAR;DANI OTERO;GEMA BROOKS, Obstetrician;Primary Nurse;Primary  Nurse;Neonatologist;Anesthesiologist;Crna;Respiratory Therapist;Scrub Tech;Surgeon Assistant             Group B Strep:   Lab Results   Component Value Date/Time    GrBStrep, External Negative 2019 12:00 AM     Information for the patient's :  Christine Fry [[id number]]   No results found for: ABORH, PCTABR, PCTDIG, BILI, ABORHEXT, ABORH     Recent Labs     22  1031   PCO2CB 40  50   PO2CB 32  16   HCO3I 25.9   SO2I 19.3*   IBD 2.0  0.8   SPECTI VENOUS CORD  ARTERIAL CORD   PHICB 7.37  7.32

## 2022-04-22 NOTE — H&P
History & Physical    Name: Rajesh Dhaliwal MRN: [id number]  SSN: [id number]    YOB: 1990  Age: 33 y.o. Sex: female      Subjective:     Estimated Date of Delivery: 22  OB History    Para Term  AB Living   3 2 2     2   SAB IAB Ectopic Molar Multiple Live Births           0 2      # Outcome Date GA Lbr Andres/2nd Weight Sex Delivery Anes PTL Lv   3 Current            2 Term 19 40w2d  7 lb 0.2 oz (3.18 kg) F CS-LTranv Spinal N SHUBHAM   1 Term 16 40w3d  6 lb 4.5 oz (2.85 kg) M CS-LTranv EPIDURAL AN N SHUBHAM      Complications: Fetal Intolerance       Ms. Marcy Smith is admitted with pregnancy at 39w4d for  section due to previous  section. Prenatal course was normal. Please see prenatal records for details.     Past Medical History:   Diagnosis Date    Acute cholecystitis     Bowel trouble     Cholecystitis, unspecified 4/10/2019    Gallbladder calculus without cholecystitis 6/15/2016    Gallstones     GERD (gastroesophageal reflux disease)     Managed with meds     Malignant melanoma of left lower extremity including hip ([facility] [address] 2017    Overview:  Added automatically from request for surgery [id number]    Maternal arrhythmia complicating pregnancy in second trimester     Melanoma ([facility] [address]     left hip- surgery with lymph node removal      Past Surgical History:   Procedure Laterality Date    HX  SECTION      x2    HX CHOLECYSTECTOMY      HX HEENT      wisdom teeth    HX LYMPH NODE DISSECTION      groin    HX OTHER SURGICAL      melanoma removed left hip     Social History     Occupational History    Not on file   Tobacco Use    Smoking status: Never Smoker    Smokeless tobacco: Never Used   Substance and Sexual Activity    Alcohol use: Not Currently     Alcohol/week: 0.0 standard drinks     Comment: rarely    Drug use: Not Currently     Types: Marijuana    Sexual activity: Yes     Partners: Male     Birth control/protection: None     Family History   Problem Relation Age of Onset    Ovarian Cancer Mother     Alcohol abuse Mother     Depression Mother     Bipolar Disorder Mother     Alcohol abuse Father     Depression Father     Suicide Father     Diabetes Maternal Grandmother     COPD Maternal Grandmother     Atrial Fibrillation Maternal Grandmother     Diabetes Paternal Grandmother     Cancer Paternal Grandfather         throat       Allergies   Allergen Reactions    Tape [Adhesive] Rash     Surgical tape     Prior to Admission medications    Medication Sig Start Date End Date Taking? Authorizing Provider   cholecalciferol (Vitamin D3) (2,000 UNITS /50 MCG) cap capsule Take  by mouth daily. 2 po qd    Provider, Historical   PNV COMBO#47/IRON/FA #1/DHA (PNV-DHA PO) Take 1 Tab by mouth daily. Provider, Historical        Review of Systems    Objective:     Vitals: There were no vitals filed for this visit. Physical Exam:  Physical Exam  Cervical Exam: Closed/Thick/High  Membranes: Intact  Fetal Heart Rate: Reactive    Prenatal Labs:   Lab Results   Component Value Date/Time    ABO/Rh(D) A POSITIVE 2019 06:20 AM    Rubella, External immune 2021 12:00 AM    GrBStrep, External Negative 2019 12:00 AM    HBsAg, External negative 2021 12:00 AM    HIV, External non reactive 2021 12:00 AM    RPR, External non reactive 2021 12:00 AM    ABO,Rh A positive 2021 12:00 AM         Impression/Plan:     Active Problems:    Previous  section (2022)         Plan:  Admit for  section. Group B Strep was negative. Discussed the risks of surgery including the risks of bleeding, infection, deep vein thrombosis, and surgical injuries to internal organs including but not limited to the bowels, bladder, rectum, and female reproductive organs. The patient understands the risks; any and all questions were answered to the patient's satisfaction.

## 2022-04-22 NOTE — ANESTHESIA PREPROCEDURE EVALUATION
Relevant Problems   No relevant active problems       Anesthetic History     Other anesthesia complications     Comments: Severe itching with previous It morphine 0.25 mg dose     Review of Systems / Medical History  Patient summary reviewed, nursing notes reviewed and pertinent labs reviewed    Pulmonary  Within defined limits                 Neuro/Psych   Within defined limits           Cardiovascular            Dysrhythmias (Maternal arrhythmia - per EMR)       Exercise tolerance: >4 METS  Comments: Echo 2021 - LV: EF is 55 - 60%. Normal cavity size, wall thickness, systolic function and diastolic function. Wall motion: normal.    7/2020 - Echo showed decreased EF systolic function - unknown etiology.     Consider risk of Post partem cardiomyopathy  Reports tachycardia throughout pregnancy   GI/Hepatic/Renal     GERD: well controlled          Comments: Cholecystitis per EMR Endo/Other        Cancer (Melanoma per EMR)     Other Findings   Comments: EMR notes Chromosomal abnormality -  patient's second baby found to have bicuspid aortic valve (mother and father do not have bicuspid valves)    4/21/22 - Hgb 12.7, Plt 169         Physical Exam    Airway  Mallampati: I  TM Distance: 4 - 6 cm  Neck ROM: normal range of motion   Mouth opening: Normal     Cardiovascular  Regular rate and rhythm,  S1 and S2 normal,  no murmur, click, rub, or gallop  Rhythm: regular  Rate: normal         Dental  No notable dental hx       Pulmonary  Breath sounds clear to auscultation               Abdominal  GI exam deferred       Other Findings            Anesthetic Plan    ASA: 2  Anesthesia type: spinal      Post-op pain plan if not by surgeon: intrathecal opiates      Anesthetic plan and risks discussed with: Patient      Decrease dose of IT morphine to 0.15 mg (previous dose 0.25 mg)

## 2022-04-22 NOTE — ANESTHESIA PROCEDURE NOTES
Spinal Block    Start time: 4/22/2022 9:55 AM  End time: 4/22/2022 9:59 AM  Performed by: Rajwinder Hilliard MD  Authorized by: Rajwinder Hilliard MD     Pre-procedure: Indications: at surgeon's request and primary anesthetic  Preanesthetic Checklist: patient identified, risks and benefits discussed, anesthesia consent, patient being monitored and timeout performed    Timeout Time: 09:52 EDT  Preanesthetic Checklist comment:  Risk of nerve damage discussed. Spinal Block:   Patient Position:  Seated  Prep Region:  Lumbar  Prep: chlorhexidine and patient draped      Location:  L3-4  Technique:  Single shot    Local Dose (mL):  3    Needle:   Needle Type: Jem  Needle Gauge:  25 G  Attempts:  1      Events: CSF confirmed, no blood with aspiration and no paresthesia        Assessment:  Insertion:  Uncomplicated  Patient tolerance:  Patient tolerated the procedure well with no immediate complications  Single pass, without paresthesia, CSF aspirated before and after injection.   No complications noted

## 2022-04-22 NOTE — OP NOTES
Rajesh Lainezprasad  [id number]      INTRAUTERINE PREGNANCY  SECTION FULL OP NOTE        DATE OF PROCEDURE:  2022    PREOPERATIVE DIAGNOSIS:  History of  [Z98.891], 44 + weeks gestational age. POSTOPERATIVE DIAGNOSIS:  STATUS POST REPEAT  SECTION     ADDITIONAL DIAGNOSES: viable male, 7,8.  6#11oz    PROCEDURE: Low transverse  section    SURGEON:  Girma Greene MD    ASSISTANT:  James Chapman DO whose presence was required during dissection of the abdominal wall, delivery of the fetal head and closure of the hysterotomy. ANESTHESIA: Spinal    EBL: 744 cc    COMPLICATIONS: none    OPERATIVE PROCEDURE: Patient was placed on the operating room table in the supine position/left lateral tilt. Time out was done to confirm the operating procedure, surgeon, patient and site. Once confirmed by the team, procedure was started. After having adequate regional anesthesia by spinal injection, the patient was prepped and draped in the usual fashion for abdominal surgery. A Pfannenstiel incision was made and carried down sharply through the skin, subcutaneous tissue, and fascia. Fascia was sharply dissected free from underlying rectus muscles. The peritoneum was sharply entered and extended vertically. DeLee bladder blade was then placed over the bladder. The visceroperitoneal reflection over the lower uterine segment was incised transversely and the bladder flap sharply and bluntly developed. The uterus was incised transversely, then extended bluntly, and the infants head was delivered without difficulty and fundal pressure. Fluid was clear. Cord was clamped and cut. Mouth and nose were suctioned clean. The infant was given to the NICU personnel present at the time of delivery. The placenta was expressed, intact on inspection. The uterus was exteriorized, wrapped in a wet lap square, curetted with a dry square, and closed in a double-layered fashion with #1 chromic.  Hemostasis appeared adequate. The cul-de-sac was then irrigated and suctioned clean. The uterus was placed in the abdomen. The gutters were irrigated and suctioned clean. The peritoneum and rectus muscles were closed with 2-0 chromic. The fascia was closed with 0 vicryl. Running 2-0 plain was used to reapproximate the subcutaneous tissue. 4-0 Vicryl was used in a subcuticular stitch. The patient tolerated the procedure well and went to the recovery room in satisfactory condition.

## 2022-04-23 LAB
BASOPHILS # BLD: 0 K/UL (ref 0–0.2)
BASOPHILS NFR BLD: 0 % (ref 0–2)
DIFFERENTIAL METHOD BLD: ABNORMAL
EOSINOPHIL # BLD: 0.1 K/UL (ref 0–0.8)
EOSINOPHIL NFR BLD: 1 % (ref 0.5–7.8)
ERYTHROCYTE [DISTWIDTH] IN BLOOD BY AUTOMATED COUNT: 13.5 % (ref 11.9–14.6)
HCT VFR BLD AUTO: 31.3 % (ref 35.8–46.3)
HGB BLD-MCNC: 10.7 G/DL (ref 11.7–15.4)
IMM GRANULOCYTES # BLD AUTO: 0.1 K/UL (ref 0–0.5)
IMM GRANULOCYTES NFR BLD AUTO: 1 % (ref 0–5)
LYMPHOCYTES # BLD: 1.2 K/UL (ref 0.5–4.6)
LYMPHOCYTES NFR BLD: 8 % (ref 13–44)
MCH RBC QN AUTO: 30.8 PG (ref 26.1–32.9)
MCHC RBC AUTO-ENTMCNC: 34.2 G/DL (ref 31.4–35)
MCV RBC AUTO: 90.2 FL (ref 79.6–97.8)
MONOCYTES # BLD: 1 K/UL (ref 0.1–1.3)
MONOCYTES NFR BLD: 7 % (ref 4–12)
NEUTS SEG # BLD: 11.8 K/UL (ref 1.7–8.2)
NEUTS SEG NFR BLD: 84 % (ref 43–78)
NRBC # BLD: 0 K/UL (ref 0–0.2)
PLATELET # BLD AUTO: 137 K/UL (ref 150–450)
PMV BLD AUTO: 11.1 FL (ref 9.4–12.3)
RBC # BLD AUTO: 3.47 M/UL (ref 4.05–5.2)
WBC # BLD AUTO: 14.2 K/UL (ref 4.3–11.1)

## 2022-04-23 PROCEDURE — 2709999900 HC NON-CHARGEABLE SUPPLY

## 2022-04-23 PROCEDURE — 74011250637 HC RX REV CODE- 250/637: Performed by: OBSTETRICS & GYNECOLOGY

## 2022-04-23 PROCEDURE — 85025 COMPLETE CBC W/AUTO DIFF WBC: CPT

## 2022-04-23 PROCEDURE — 36415 COLL VENOUS BLD VENIPUNCTURE: CPT

## 2022-04-23 PROCEDURE — 74011250636 HC RX REV CODE- 250/636: Performed by: ANESTHESIOLOGY

## 2022-04-23 PROCEDURE — 65270000029 HC RM PRIVATE

## 2022-04-23 RX ORDER — POLYETHYLENE GLYCOL 3350 17 G/17G
17 POWDER, FOR SOLUTION ORAL DAILY
Status: DISCONTINUED | OUTPATIENT
Start: 2022-04-23 | End: 2022-04-24 | Stop reason: HOSPADM

## 2022-04-23 RX ORDER — DOCUSATE SODIUM 100 MG/1
100 CAPSULE, LIQUID FILLED ORAL 2 TIMES DAILY
Status: DISCONTINUED | OUTPATIENT
Start: 2022-04-23 | End: 2022-04-24 | Stop reason: HOSPADM

## 2022-04-23 RX ORDER — SODIUM CHLORIDE 0.9 % (FLUSH) 0.9 %
5-40 SYRINGE (ML) INJECTION EVERY 8 HOURS
Status: DISCONTINUED | OUTPATIENT
Start: 2022-04-23 | End: 2022-04-23 | Stop reason: ALTCHOICE

## 2022-04-23 RX ORDER — IBUPROFEN 800 MG/1
800 TABLET ORAL
Status: DISCONTINUED | OUTPATIENT
Start: 2022-04-23 | End: 2022-04-24 | Stop reason: HOSPADM

## 2022-04-23 RX ORDER — OXYCODONE AND ACETAMINOPHEN 7.5; 325 MG/1; MG/1
2 TABLET ORAL
Status: DISCONTINUED | OUTPATIENT
Start: 2022-04-23 | End: 2022-04-24 | Stop reason: HOSPADM

## 2022-04-23 RX ORDER — SODIUM CHLORIDE 0.9 % (FLUSH) 0.9 %
5-40 SYRINGE (ML) INJECTION AS NEEDED
Status: DISCONTINUED | OUTPATIENT
Start: 2022-04-23 | End: 2022-04-23 | Stop reason: ALTCHOICE

## 2022-04-23 RX ORDER — OXYCODONE AND ACETAMINOPHEN 7.5; 325 MG/1; MG/1
1 TABLET ORAL
Status: DISCONTINUED | OUTPATIENT
Start: 2022-04-23 | End: 2022-04-24 | Stop reason: HOSPADM

## 2022-04-23 RX ORDER — ZOLPIDEM TARTRATE 5 MG/1
5 TABLET ORAL
Status: DISCONTINUED | OUTPATIENT
Start: 2022-04-23 | End: 2022-04-24 | Stop reason: HOSPADM

## 2022-04-23 RX ORDER — SIMETHICONE 80 MG
80 TABLET,CHEWABLE ORAL
Status: DISCONTINUED | OUTPATIENT
Start: 2022-04-23 | End: 2022-04-24 | Stop reason: HOSPADM

## 2022-04-23 RX ORDER — NALOXONE HYDROCHLORIDE 0.4 MG/ML
0.4 INJECTION, SOLUTION INTRAMUSCULAR; INTRAVENOUS; SUBCUTANEOUS AS NEEDED
Status: DISCONTINUED | OUTPATIENT
Start: 2022-04-23 | End: 2022-04-24 | Stop reason: HOSPADM

## 2022-04-23 RX ORDER — DIPHENHYDRAMINE HCL 25 MG
25 CAPSULE ORAL
Status: DISCONTINUED | OUTPATIENT
Start: 2022-04-23 | End: 2022-04-24 | Stop reason: HOSPADM

## 2022-04-23 RX ADMIN — DOCUSATE SODIUM 100 MG: 100 CAPSULE ORAL at 00:30

## 2022-04-23 RX ADMIN — OXYCODONE AND ACETAMINOPHEN 1 TABLET: 7.5; 325 TABLET ORAL at 12:04

## 2022-04-23 RX ADMIN — NALBUPHINE HYDROCHLORIDE 5 MG: 10 INJECTION, SOLUTION INTRAMUSCULAR; INTRAVENOUS; SUBCUTANEOUS at 05:58

## 2022-04-23 RX ADMIN — IBUPROFEN 800 MG: 800 TABLET, FILM COATED ORAL at 22:32

## 2022-04-23 RX ADMIN — IBUPROFEN 800 MG: 800 TABLET, FILM COATED ORAL at 10:30

## 2022-04-23 RX ADMIN — SIMETHICONE CHEW TAB 80 MG 80 MG: 80 TABLET ORAL at 00:30

## 2022-04-23 RX ADMIN — SIMETHICONE CHEW TAB 80 MG 80 MG: 80 TABLET ORAL at 07:30

## 2022-04-23 RX ADMIN — POLYETHYLENE GLYCOL 3350 17 G: 17 POWDER, FOR SOLUTION ORAL at 22:49

## 2022-04-23 RX ADMIN — DOCUSATE SODIUM 100 MG: 100 CAPSULE ORAL at 17:54

## 2022-04-23 RX ADMIN — IBUPROFEN 800 MG: 800 TABLET, FILM COATED ORAL at 15:59

## 2022-04-23 RX ADMIN — KETOROLAC TROMETHAMINE 30 MG: 30 INJECTION, SOLUTION INTRAMUSCULAR at 04:54

## 2022-04-23 RX ADMIN — SIMETHICONE CHEW TAB 80 MG 80 MG: 80 TABLET ORAL at 22:32

## 2022-04-23 RX ADMIN — NALBUPHINE HYDROCHLORIDE 5 MG: 10 INJECTION, SOLUTION INTRAMUSCULAR; INTRAVENOUS; SUBCUTANEOUS at 00:24

## 2022-04-23 RX ADMIN — POLYETHYLENE GLYCOL 3350 17 G: 17 POWDER, FOR SOLUTION ORAL at 00:30

## 2022-04-23 RX ADMIN — SIMETHICONE CHEW TAB 80 MG 80 MG: 80 TABLET ORAL at 12:04

## 2022-04-23 RX ADMIN — OXYCODONE AND ACETAMINOPHEN 1 TABLET: 7.5; 325 TABLET ORAL at 17:54

## 2022-04-23 RX ADMIN — SIMETHICONE CHEW TAB 80 MG 80 MG: 80 TABLET ORAL at 15:59

## 2022-04-23 NOTE — LACTATION NOTE

## 2022-04-23 NOTE — PROGRESS NOTES
Post-Operative Day Number 1 Progress Note    Patient doing well post-op day 1 from  delivery without significant complaints. Pain controlled on current medication. Voiding without difficulty, normal lochia. Vitals:  Patient Vitals for the past 8 hrs:   BP Temp Pulse Resp SpO2   22 0749 111/63 98.6 °F (37 °C) 85 15 97 %   22 0308 108/68 98.8 °F (37.1 °C) 88 15 97 %     Temp (24hrs), Av.4 °F (36.9 °C), Min:97.7 °F (36.5 °C), Max:98.8 °F (37.1 °C)      Vital signs stable, afebrile. Exam:  Patient without distress. Abdomen soft, fundus firm at level of umbilicus, non tender. Incision dry and clean without erythema. Lower extremities are negative for swelling, cords or tenderness. Lab/Data Review:  CBC:   Lab Results   Component Value Date/Time    WBC 14.2 (H) 2022 06:18 AM    HGB 10.7 (L) 2022 06:18 AM    HCT 31.3 (L) 2022 06:18 AM     (L) 2022 06:18 AM       Assessment and Plan:  Patient appears to be having uncomplicated post- course. Continue routine post-op care and maternal education.

## 2022-04-23 NOTE — PROGRESS NOTES
Anesthesiology  Post-op Note    Post-op day 1 s/p  via spinal with neuraxial opioids for post-op pain management. Visit Vitals  /68 (BP 1 Location: Right upper arm, BP Patient Position: Sitting)   Pulse 88   Temp 37.1 °C (98.8 °F)   Resp 15   LMP 2021   SpO2 97%   Breastfeeding Unknown     Airway patent, patient appropriately hydrated and appears euvolemic. Patient is Alert and oriented. Pain is well controlled. Pruritus is well controlled. Nausea is absent. No complaints about back or site of injection. Motor and sensory function has returned to baseline in lower extremities. Patient is satisfied with anesthetic and reports no complications. Continue current orders, then initiate surgeon's orders for pain management 24 hours after . Follow up per surgeon. Patient sitting up in bed resting with infant in crib beside her. Up walking a few times overnight.  Pain well controlled

## 2022-04-23 NOTE — PROGRESS NOTES
Patient up to bathroom with RN assistance. Jessika-care taught and completed. Questions encouraged and answered. Patient ambulating without difficulty, encouraged to call for needs or concerns. Verbalizes understanding.

## 2022-04-23 NOTE — PROGRESS NOTES
04/22/22 7656   Pain Assessment   Pain Scale 1 Numeric (0 - 10)   Pain Intensity 1 3   Pain Onset 1 Post Op   Pain Location 1 Abdomen; Incisional;Back   Pain Orientation 1 Anterior; Lower   Pain Description 1 Aching;Cramping; Sore   Pain Intervention(s) 1 Medication (see MAR)   Vital Signs   Level of Consciousness Alert (0)   Pain Screen   Patient Stated Pain Goal 2   POSS Scale   Opioid Sedation Scale 1     PRN Toradol 30mg/IV for pain

## 2022-04-23 NOTE — PROGRESS NOTES
Care Management Interventions  PCP Verified by CM: Yes  Support Systems: Spouse/Significant Other  Discharge Location  Patient Expects to be Discharged to[de-identified] Home with family assistance  Discussed and provided patient with  informational packet on  mood and anxiety disorders (resources/education).

## 2022-04-23 NOTE — PROGRESS NOTES
04/23/22 0454   Pain Assessment   Pain Scale 1 Numeric (0 - 10)   Pain Intensity 1 3   Pain Onset 1 Post Op   Pain Location 1 Abdomen;Back; Incisional   Pain Orientation 1 Anterior; Lower   Pain Description 1 Aching;Cramping; Sore   Pain Intervention(s) 1 Medication (see MAR)   Vital Signs   Level of Consciousness Alert (0)   Pain Screen   Patient Stated Pain Goal 2   POSS Scale   Opioid Sedation Scale 1     PRN Toradol 30mg/IV for pain

## 2022-04-23 NOTE — ANESTHESIA POSTPROCEDURE EVALUATION
Procedure(s):   SECTION.     spinal    Anesthesia Post Evaluation      Multimodal analgesia: multimodal analgesia used between 6 hours prior to anesthesia start to PACU discharge  Patient location during evaluation: bedside  Patient participation: complete - patient participated  Level of consciousness: awake and alert and responsive to verbal stimuli  Pain score: 2  Pain management: adequate  Airway patency: patent  Anesthetic complications: no  Cardiovascular status: acceptable and hemodynamically stable  Respiratory status: acceptable  Hydration status: acceptable  Post anesthesia nausea and vomiting:  none  Final Post Anesthesia Temperature Assessment:  Normothermia (36.0-37.5 degrees C)      INITIAL Post-op Vital signs:   Vitals Value Taken Time   /65 22   Temp 36.8 °C (98.3 °F) 22   Pulse 89 22   Resp 19 22   SpO2 97 % 22

## 2022-04-23 NOTE — LACTATION NOTE
This note was copied from a baby's chart. Experienced mom. History of tethered oral tissue in other 2 children. Baby does appear to have tight labial and lingual frenulum. Tongue back with digital assessment and weaker pull. Recessed chin. Discussed only Day 2, will need to follow progression of function. Mom states R side is more difficult, but baby has started to latch on R some. Suggested pumping R if not consistently latching. Mom typically will pump once home. Planning for discharge tomorrow. Declined assistance on R. Observed at breast in cross cradle on L. Baby fed fair, a bit sleepy. Demonstrated manual lip flange. Mom has a larger nipple and baby has a smaller mouth. Encouraged frequent feeding and watch output.

## 2022-04-24 VITALS
SYSTOLIC BLOOD PRESSURE: 104 MMHG | OXYGEN SATURATION: 100 % | HEART RATE: 83 BPM | DIASTOLIC BLOOD PRESSURE: 73 MMHG | TEMPERATURE: 97.8 F | RESPIRATION RATE: 18 BRPM

## 2022-04-24 PROCEDURE — 74011250637 HC RX REV CODE- 250/637: Performed by: OBSTETRICS & GYNECOLOGY

## 2022-04-24 RX ORDER — IBUPROFEN 800 MG/1
800 TABLET ORAL
Qty: 30 TABLET | Refills: 0 | Status: SHIPPED | OUTPATIENT
Start: 2022-04-24 | End: 2022-05-05 | Stop reason: SDUPTHER

## 2022-04-24 RX ORDER — OXYCODONE AND ACETAMINOPHEN 7.5; 325 MG/1; MG/1
1 TABLET ORAL
Qty: 12 TABLET | Refills: 0 | Status: SHIPPED | OUTPATIENT
Start: 2022-04-24 | End: 2022-04-28

## 2022-04-24 RX ORDER — MAG HYDROX/ALUMINUM HYD/SIMETH 200-200-20
5 SUSPENSION, ORAL (FINAL DOSE FORM) ORAL ONCE
Status: COMPLETED | OUTPATIENT
Start: 2022-04-24 | End: 2022-04-24

## 2022-04-24 RX ORDER — FAMOTIDINE 20 MG/1
20 TABLET, FILM COATED ORAL
Status: COMPLETED | OUTPATIENT
Start: 2022-04-24 | End: 2022-04-24

## 2022-04-24 RX ADMIN — FAMOTIDINE 20 MG: 20 TABLET, FILM COATED ORAL at 02:52

## 2022-04-24 RX ADMIN — SIMETHICONE CHEW TAB 80 MG 80 MG: 80 TABLET ORAL at 08:27

## 2022-04-24 RX ADMIN — OXYCODONE AND ACETAMINOPHEN 1 TABLET: 7.5; 325 TABLET ORAL at 08:27

## 2022-04-24 RX ADMIN — DOCUSATE SODIUM 100 MG: 100 CAPSULE ORAL at 08:27

## 2022-04-24 RX ADMIN — POLYETHYLENE GLYCOL 3350 17 G: 17 POWDER, FOR SOLUTION ORAL at 08:27

## 2022-04-24 RX ADMIN — OXYCODONE AND ACETAMINOPHEN 1 TABLET: 7.5; 325 TABLET ORAL at 01:48

## 2022-04-24 RX ADMIN — IBUPROFEN 800 MG: 800 TABLET, FILM COATED ORAL at 10:58

## 2022-04-24 RX ADMIN — IBUPROFEN 800 MG: 800 TABLET, FILM COATED ORAL at 04:27

## 2022-04-24 RX ADMIN — ALUMINUM HYDROXIDE, MAGNESIUM HYDROXIDE, DIMETHICONE 5 ML: 200; 200; 20 LIQUID ORAL at 02:52

## 2022-04-24 NOTE — PROGRESS NOTES
Patient discharged to home per MD orders. Discharge instructions reviewed with patient. Questions encouraged and answered. Patient verbalizes understanding. Stable at discharge.

## 2022-04-24 NOTE — DISCHARGE INSTRUCTIONS
Discharge instruction to follow: Activity: Pelvis rest for 6 weeks     No heavy lifting over 15 lbs for 2 weeks     No driving for 2 weeks     No push/pull motion such as sweeping or vacuuming for 2 weeks     No tub baths for 6 weeks     section keep incision clean and dry, may shower as normal with soap and water. Inspect incision every day for signs of infection listed below. Continue to use girish-bottle with every void or bowel movement until comfortable stopping. Change sanitary pad after each urination or bowel movement. Call MD for the following:      Fever over 101 F; pain not relieved by medication; foul smelling vaginal discharge or increase in vaginal bleeding. Redness, swelling, or drainage from  incision. Take medication as prescribed. Follow up with MD as order.  Section: What to Expect at [address] [place] [address]     A  section, or , is surgery to deliver your baby through a cut that the doctor makes in your lower belly and uterus. The cut is called an incision. You may have some pain in your lower belly and need pain medicine for 1 to 2 weeks. You can expect some vaginal bleeding for several weeks. You will probably need about 6 weeks to fully recover. It's important to take it easy while the incision heals. Avoid heavy lifting, strenuous activities, and exercises that strain the belly muscles while you recover. Ask a family member or friend for help with housework, cooking, and shopping. This care sheet gives you a general idea about how long it will take for you to recover. But each person recovers at a different pace. Follow the steps below to get better as quickly as possible. How can you care for yourself at home? Activity    · Rest when you feel tired. Getting enough sleep will help you recover.     · Try to walk each day. Start by walking a little more than you did the day before. Bit by bit, increase the amount you walk.  Walking boosts blood flow and helps prevent pneumonia, constipation, and blood clots.     · Avoid strenuous activities, such as bicycle riding, jogging, weightlifting, and aerobic exercise, for 6 weeks or until your doctor says it is okay.     · Until your doctor says it is okay, do not lift anything heavier than your baby.     · Do not do sit-ups or other exercises that strain the belly muscles for 6 weeks or until your doctor says it is okay.     · Hold a pillow over your incision when you cough or take deep breaths. This will support your belly and decrease your pain.     · You may shower as usual. Pat the incision dry when you are done.     · You will have some vaginal bleeding. Wear sanitary pads. Do not douche or use tampons until your doctor says it is okay.     · Ask your doctor when you can drive again.     · You will probably need to take at least 6 weeks off work. It depends on the type of work you do and how you feel.     · Ask your doctor when it is okay for you to have sex. Diet    · You can eat your normal diet. If your stomach is upset, try bland, low-fat foods like plain rice, broiled chicken, toast, and yogurt.     · Drink plenty of fluids (unless your doctor tells you not to).     · You may notice that your bowel movements are not regular right after your surgery. This is common. Try to avoid constipation and straining with bowel movements. You may want to take a fiber supplement every day. If you have not had a bowel movement after a couple of days, ask your doctor about taking a mild laxative.     · If you are breastfeeding, limit alcohol. Alcohol can cause a lack of energy and other health problems for the baby when a breastfeeding woman drinks heavily. It can also get in the way of a mom's ability to feed her baby or to care for the child in other ways. There isn't a lot of research about exactly how much alcohol can harm a baby. Having no alcohol is the safest choice for your baby.  If you choose to have a drink now and then, have only one drink, and limit the number of occasions that you have a drink. Wait to breastfeed at least 2 hours after you have a drink to reduce the amount of alcohol the baby may get in the milk. Medicines    · Your doctor will tell you if and when you can restart your medicines. You will also get instructions about taking any new medicines.     · If you take aspirin or some other blood thinner, ask your doctor if and when to start taking it again. Make sure that you understand exactly what your doctor wants you to do.     · Take pain medicines exactly as directed. ? If the doctor gave you a prescription medicine for pain, take it as prescribed. ? If you are not taking a prescription pain medicine, ask your doctor if you can take an over-the-counter medicine.     · If you think your pain medicine is making you sick to your stomach:  ? Take your medicine after meals (unless your doctor has told you not to). ? Ask your doctor for a different pain medicine.     · If your doctor prescribed antibiotics, take them as directed. Do not stop taking them just because you feel better. You need to take the full course of antibiotics. Incision care    · If you have strips of tape on the incision, leave the tape on for a week or until it falls off.     · Wash the area daily with warm, soapy water, and pat it dry. Don't use hydrogen peroxide or alcohol, which can slow healing. You may cover the area with a gauze bandage if it weeps or rubs against clothing. Change the bandage every day.     · Keep the area clean and dry. Other instructions    · If you breastfeed your baby, you may be more comfortable while you are healing if you don't rest your baby on your belly. Try tucking your baby under your arm, with your baby's body along the side you will be feeding on. Support your baby's upper body with your arm.  With that hand you can control your baby's head to bring your baby's mouth to your breast. This is sometimes called the football hold. Follow-up care is a key part of your treatment and safety. Be sure to make and go to all appointments, and call your doctor if you are having problems. It's also a good idea to know your test results and keep a list of the medicines you take. When should you call for help? Share this information with your partner, family, or a friend. They can help you watch for warning signs. Call 911  anytime you think you may need emergency care. For example, call if:    · You have thoughts of harming yourself, your baby, or another person.     · You passed out (lost consciousness).     · You have chest pain, are short of breath, or cough up blood.     · You have a seizure. Call your doctor now or seek immediate medical care if:    · You have loose stitches, or your incision comes open.     · You have signs of hemorrhage (too much bleeding), such as:  ? Heavy vaginal bleeding. This means that you are soaking through one or more pads in an hour. Or you pass blood clots bigger than an egg. ? Feeling dizzy or lightheaded, or you feel like you may faint. ? Feeling so tired or weak that you cannot do your usual activities. ? A fast or irregular heartbeat. ? New or worse belly pain.     · You have symptoms of infection, such as:  ? Increased pain, swelling, warmth, or redness. ? Red streaks leading from the incision. ? Pus draining from the incision. ? A fever. ? Vaginal discharge that smells bad.  ? New or worse belly pain.     · You have symptoms of a blood clot in your leg (called a deep vein thrombosis), such as:  ? Pain in your calf, back of the knee, thigh, or groin. ? Redness and swelling in your leg or groin.     · You have signs of preeclampsia, such as:  ? Sudden swelling of your face, hands, or feet. ? New vision problems (such as dimness, blurring, or seeing spots). ? A severe headache.    Watch closely for changes in your health, and be sure to contact your doctor if:    · Your vaginal bleeding isn't decreasing.     · You feel sad, anxious, or hopeless for more than a few days.     · You are having problems with your breasts or breastfeeding. Where can you learn more? Go to http://www.ha.com/  Enter M806 in the search box to learn more about \" Section: What to Expect at Home. \"  Current as of: 2021               Content Version: 13.2   [employer], [employer]. Care instructions adapted under license by [employer] (which disclaims liability or warranty for this information). If you have questions about a medical condition or this instruction, always ask your healthcare professional. [address] any warranty or liability for your use of this information.

## 2022-04-24 NOTE — PROGRESS NOTES
04/23/22 1058   Pain Assessment   Pain Scale 1 Numeric (0 - 10)   Pain Intensity 1 3   Pain Onset 1 Post Op   Pain Location 1 Abdomen; Incisional   Pain Orientation 1 Anterior; Lower   Pain Description 1 Aching;Cramping; Sore   Pain Intervention(s) 1 Medication (see MAR)   Vital Signs   Level of Consciousness Alert (0)   Pain Screen   Patient Stated Pain Goal 2   POSS Scale   Opioid Sedation Scale 1     PRN Motrin 800mg for pain

## 2022-04-24 NOTE — LACTATION NOTE
Lactation visit. Early discharge requested. Nursing fairly well per mom report. Adequate feeds and output noted in chart, weight loss within normal range. Milk coming in very well. Mom states baby did latch to right breast at prior feed and she took baby off and milk wad dripping from nipple. Discussed feeds and expectations. See how latching and feeds progress. If output lacking or if baby not latching well to one side, would advise pumping. Mom has pump for home use and curved syringes given as needed for use. Mom confident. Offered help prior to discharge today as needed.

## 2022-04-24 NOTE — DISCHARGE SUMMARY
Post-Operative Day Number 2 Progress/Discharge Note    Patient doing well post-op day 2 from  delivery without significant complaints. Pain controlled on current medication. Voiding without difficulty, normal lochia. Patient desires discharge to home today. Vitals:  Patient Vitals for the past 8 hrs:   BP Temp Pulse Resp SpO2   22 0739 104/73 97.8 °F (36.6 °C) 83 18 100 %     Temp (24hrs), Av.4 °F (36.9 °C), Min:97.8 °F (36.6 °C), Max:98.7 °F (37.1 °C)      Vital signs stable, afebrile. Exam:  Patient without distress. Abdomen soft, fundus firm at level of umbilicus, non tender. Incision dry and                      clean without erythema. Lower extremities are negative for swelling, cords or tenderness. Lab/Data Review: All lab results for the last 24 hours reviewed. Assessment and Plan:  Patient appears to be having uncomplicated post- course. Continue routine post-op care and maternal education. Plan discharge for today with follow up in our office in 1-2 weeks.

## 2022-04-24 NOTE — PROGRESS NOTES
Dr. Hunter Quintana notified that patient is requesting something for heartburn. New orders received for Mylanta 5mls PO Now (Once) and Pepcid 20mg PO Now (Once). Verbal with read back.

## 2022-04-24 NOTE — PROGRESS NOTES
228   [place]  Depression Scale: In the Past 7 Days   I have been able to laugh and see the funny side of things. 0   I have looked forward with enjoyment to things. 0   I have blamed myself unnecessarily when things went wrong. 1   I have been anxious or worried for no good reason. 1   I have felt scared or panicky for no good reason. 0   Things have been getting on top of me. 0   I have been so unhappy that I have had difficulty sleeping. 1   I have felt sad or miserable. 1   I have been so unhappy that I have been crying.  1   The thought of harming myself has occurred to me. 0   [place]  Depression Scale Total 5     Reviewed and completed

## 2022-04-24 NOTE — PROGRESS NOTES
04/24/22 0148   Pain Assessment   Pain Scale 1 Numeric (0 - 10)   Pain Intensity 1 6   Pain Onset 1 Post Op   Pain Location 1 Abdomen; Incisional;Back   Pain Orientation 1 Anterior; Lower   Pain Description 1 Aching;Cramping; Sore   Pain Intervention(s) 1 Medication (see MAR)   Vital Signs   Level of Consciousness Alert (0)   Pain Screen   Patient Stated Pain Goal 3   POSS Scale   Opioid Sedation Scale 1     PRN Percocet 7.5/325mg for pain

## 2022-04-25 NOTE — ADT AUTH CERT NOTES
Comments  Comment          Facility Name: [address] [place] [address]                                 Patient Demographics    Patient Name   Eduardo Castro Legal Sex   Female    1990 Address   [address] [address] Phone   [phone] (Home)   [phone] [facility]     Hospital Account    Name Acct ID Class Status Primary Coverage   Eduardo Castro [id number] INPATIENT Discharged/Not Billed BLUE CROSS - [address]            Guarantor Account (for Hospital Account [de-identified])    Name Relation to Pt Service Area Active? Acct Type   Eduardo Castro Self [facility] Yes Personal/Family   Address Phone     Eyal Gray, [address] [facility] [address] [phone](R)              Coverage Information (for Hospital Account [de-identified])    F/O Payor/Plan Subscriber  Subscriber Sex Precert #   [facility] 86 F    Subscriber Subscriber #   Eduardo Castro [id number]   [facility] # Group Name   [id number]    Address Phone   [address] [facility], [address] [place] [address]    Policy Number Status Effective Date Benefits Phone   [id number] -  -   Auth/Cert               Diagnosis     Codes Comments   Previous  section  ICD-10-CM: Z98.891   ICD-9-CM: V45.89             Admission Information    Arrival Date/Time: 2022 Admit Date/Time: 2022 IP Adm.  Date/Time: 2022   Admission Type: Scheduled Point of Origin: Physician Or Clinic Office Admit Category:    Means of Arrival:  Primary Service: Obstetrics Secondary Service: N/A   Transfer Source:  Service Area: [facility] Unit: [facility] 4 MOTHER INFANT   Admit Provider: Shantel Reed MD Attending Provider: Shantel Reed MD Referring Provider:      Admission Information    Attending Provider Admission Dx Admitted on    History of , Previous  section 22   Service Isolation Code Status   OBSTETRICS -- Prior   Allergies Advance Care Planning    Tape [Adhesive] Jump to the Activity       Admission Information    Unit/Bed: SFE 4 MOTHER INFANT Service: OBSTETRICS   Admitting provider: Erendira Mitchell MD Phone: [phone]   Attending provider:  Phone:    PCP: Desire Jolly MD Phone: [phone]   Admission dx: History of  [W95.388] Patient class: I   Admission type: [facility]       Patient Demographics    Patient Name   Yohannes Jin   [id number] Legal Sex   Female    1990 Address   [address] [place] [address] Phone   [phone] (Home)   [phone] (Mobile)     H&P Notes       H&P by Erendira Mitchell MD at 22 5722 documented on Admission (Discharged) from 2022 in [address]    Author: Erendira Mitchell MD Author Type: Physician Filed: 22 6340   Note Status: Signed Cosign: Cosign Not Required Date of Service: 22 1740   : Erendira Mitchell MD (Physician)                  History & Physical     Name: Lena Boss MRN: [id number]  SSN: [id number]    YOB: 1990  Age: 33 y.o. Sex: female       Subjective:      Estimated Date of Delivery: 22                    OB History    Para Term  AB Living   3 2 2     2   SAB IAB Ectopic Molar Multiple Live Births            0 2       # Outcome Date GA Lbr Andres/2nd Weight Sex Delivery Anes PTL Lv   3 Current                     2 Term 19 40w2d   7 lb 0.2 oz (3.18 kg) F CS-LTranv Spinal N SHUBHAM   1 Term 16 40w3d   6 lb 4.5 oz (2.85 kg) M CS-LTranv EPIDURAL AN N SHUBHAM      Complications: Fetal Intolerance         Ms. Miguel Batista is admitted with pregnancy at 39w4d for  section due to previous  section.  Prenatal course was normal. Please see prenatal records for details.          Past Medical History:   Diagnosis Date    Acute cholecystitis      Bowel trouble      Cholecystitis, unspecified 4/10/2019    Gallbladder calculus without cholecystitis 6/15/2016    Gallstones      GERD (gastroesophageal reflux disease)       Managed with meds     Malignant melanoma of left lower extremity including hip ([facility] [address] 2017     Overview:  Added automatically from request for surgery [id number]    Maternal arrhythmia complicating pregnancy in second trimester      Melanoma ([facility] [address]       left hip- surgery with lymph node removal             Past Surgical History:   Procedure Laterality Date    HX  SECTION         x2    HX CHOLECYSTECTOMY        HX HEENT         wisdom teeth    HX LYMPH NODE DISSECTION         groin    HX OTHER SURGICAL         melanoma removed left hip      Social History            Occupational History    Not on file   Tobacco Use    Smoking status: Never Smoker    Smokeless tobacco: Never Used   Substance and Sexual Activity    Alcohol use: Not Currently       Alcohol/week: 0.0 standard drinks       Comment: rarely    Drug use: Not Currently       Types: Marijuana    Sexual activity: Yes       Partners: Male       Birth control/protection: None            Family History   Problem Relation Age of Onset    Ovarian Cancer Mother      Alcohol abuse Mother      Depression Mother      Bipolar Disorder Mother      Alcohol abuse Father      Depression Father      Suicide Father      Diabetes Maternal Grandmother      COPD Maternal Grandmother      Atrial Fibrillation Maternal Grandmother      Diabetes Paternal Grandmother      Cancer Paternal Grandfather           throat               Allergies   Allergen Reactions    Tape [Adhesive] Rash       Surgical tape              Prior to Admission medications    Medication Sig Start Date End Date Taking? Authorizing Provider   cholecalciferol (Vitamin D3) (2,000 UNITS /50 MCG) cap capsule Take  by mouth daily. 2 po qd       Provider, Historical   PNV COMBO#47/IRON/FA #1/DHA (PNV-DHA PO) Take 1 Tab by mouth daily.       Provider, Historical         Review of Systems     Objective:      Vitals:   There were no vitals filed for this visit.      Physical Exam:  Physical Exam  Cervical Exam: Closed/Thick/High  Membranes: Intact  Fetal Heart Rate: Reactive     Prenatal Labs:         Lab Results   Component Value Date/Time     ABO/Rh(D) A POSITIVE 2019 06:20 AM     Rubella, External immune 2021 12:00 AM     GrBStrep, External Negative 2019 12:00 AM     HBsAg, External negative 2021 12:00 AM     HIV, External non reactive 2021 12:00 AM     RPR, External non reactive 2021 12:00 AM     ABO,Rh A positive 2021 12:00 AM            Impression/Plan:      Active Problems:    Previous  section (2022)           Plan:  Admit for  section. Group B Strep was negative. Discussed the risks of surgery including the risks of bleeding, infection, deep vein thrombosis, and surgical injuries to internal organs including but not limited to the bowels, bladder, rectum, and female reproductive organs.  The patient understands the risks; any and all questions were answered to the patient's satisfaction.                     Patient Demographics    Patient Name   Wai Coleman   [id number] Legal Sex   Female    1990 Address   [address] [facility] [address] Phone   [phone] (Home)   [phone] (Mobile)   CSN:   [id number]     Admit Date: Admit Time Room Bed   2022  7:36  [27] 01 [2141]       Attending Providers    Provider Pager From To   Kwabena Starr MD  22     Emergency Contact(s)    Name Relation Home Work Mobile   Indaiatuba Spouse [phone]

## 2022-04-25 NOTE — ADT AUTH CERT NOTES
Comments  Comment          Facility Name: [address] [place] [address]                                 Patient Demographics    Patient Name   Gaudencio Durbin Legal Sex   Female    1990 Address   [address] [address] Phone   [phone] (Home)   [phone] [facility]     Hospital Account    Name Acct ID Class Status Primary Coverage   Gaudencio Durbin [id number] INPATIENT Discharged/Not Billed BLUE CROSS - [address]            Guarantor Account (for Hospital Account [de-identified])    Name Relation to Pt Service Area Active? Acct Type   Gaudencio Durbin Self [facility] Yes Personal/Family   Address Phone     Eyal Gray, [address] [facility] [address] [phone](N)              Coverage Information (for Hospital Account [de-identified])    F/O Payor/Plan Subscriber  Subscriber Sex Precert #   [facility] 86 F    Subscriber Subscriber #   Gaudencio Durbin [id number]   Grp # Group Name   [id number]    Address Phone   [address]  [facility], [address] [place] [address]    Policy Number Status Effective Date Benefits Phone   [id number] -  -   Auth/Cert               Diagnosis     Codes Comments   Previous  section  ICD-10-CM: Z98.891   ICD-9-CM: V45.89             Admission Information    Arrival Date/Time: 2022 Admit Date/Time: 2022 IP Adm.  Date/Time: 2022   Admission Type: Scheduled Point of Origin: Physician Or Clinic Office Admit Category:    Means of Arrival:  Primary Service: Obstetrics Secondary Service: N/A   Transfer Source:  Service Area: [facility] Unit: [facility] 4 MOTHER INFANT   Admit Provider: Erendira Mitchell MD Attending Provider: Erendira Mitchell MD Referring Provider:      Admission Information    Attending Provider Admission Dx Admitted on    History of , Previous  section 22   Service Isolation Code Status   OBSTETRICS -- Prior   Allergies Advance Care Planning    Tape [Adhesive] Jump to the Activity       Admission Information    Unit/Bed: [facility] 4 MOTHER INFANT Service: OBSTETRICS   Admitting provider: Shantel Reed MD Phone: [phone]   Attending provider:  Phone:    PCP: Keisha Reese MD Phone: [phone]   Admission dx: History of  [H08.160] Patient class: I   Admission type: [facility]       Patient Demographics    Patient Name   Miri Nguyen   [id number] Legal Sex   Female    1990 Address   [address] [facility] [address] [address] Phone   [phone] (Home)   [phone] (Mobile)     H&P Notes       H&P by Shantel Reed MD at 22 1610 documented on Admission (Discharged) from 2022 in [address]    Author: Shantel Reed MD Author Type: Physician Filed: 22 0829   Note Status: Signed Cosign: Cosign Not Required Date of Service: 22 2387   : Shantel Reed MD (Physician)                  History & Physical     Name: Cody Koch MRN: [id number]  SSN: [id number]    YOB: 1990  Age: 33 y.o. Sex: female       Subjective:      Estimated Date of Delivery: 22                    OB History    Para Term  AB Living   3 2 2     2   SAB IAB Ectopic Molar Multiple Live Births            0 2       # Outcome Date GA Lbr Andres/2nd Weight Sex Delivery Anes PTL Lv   3 Current                     2 Term 19 40w2d   7 lb 0.2 oz (3.18 kg) F CS-LTranv Spinal N SHUBHAM   1 Term 16 40w3d   6 lb 4.5 oz (2.85 kg) M CS-LTranv EPIDURAL AN N SHUBHAM      Complications: Fetal Intolerance         Ms. Gloria Thurston is admitted with pregnancy at 39w4d for  section due to previous  section.  Prenatal course was normal. Please see prenatal records for details.          Past Medical History:   Diagnosis Date    Acute cholecystitis      Bowel trouble      Cholecystitis, unspecified 4/10/2019    Gallbladder calculus without cholecystitis 6/15/2016    Gallstones      GERD (gastroesophageal reflux disease)       Managed with meds     Malignant melanoma of left lower extremity including hip ([facility] [address] 2017     Overview:  Added automatically from request for surgery [id number]    Maternal arrhythmia complicating pregnancy in second trimester      Melanoma ([facility] [address]       left hip- surgery with lymph node removal             Past Surgical History:   Procedure Laterality Date    HX  SECTION         x2    HX CHOLECYSTECTOMY        HX HEENT         wisdom teeth    HX LYMPH NODE DISSECTION         groin    HX OTHER SURGICAL         melanoma removed left hip      Social History            Occupational History    Not on file   Tobacco Use    Smoking status: Never Smoker    Smokeless tobacco: Never Used   Substance and Sexual Activity    Alcohol use: Not Currently       Alcohol/week: 0.0 standard drinks       Comment: rarely    Drug use: Not Currently       Types: Marijuana    Sexual activity: Yes       Partners: Male       Birth control/protection: None            Family History   Problem Relation Age of Onset    Ovarian Cancer Mother      Alcohol abuse Mother      Depression Mother      Bipolar Disorder Mother      Alcohol abuse Father      Depression Father      Suicide Father      Diabetes Maternal Grandmother      COPD Maternal Grandmother      Atrial Fibrillation Maternal Grandmother      Diabetes Paternal Grandmother      Cancer Paternal Grandfather           throat               Allergies   Allergen Reactions    Tape [Adhesive] Rash       Surgical tape              Prior to Admission medications    Medication Sig Start Date End Date Taking? Authorizing Provider   cholecalciferol (Vitamin D3) (2,000 UNITS /50 MCG) cap capsule Take  by mouth daily. 2 po qd       Provider, Historical   PNV COMBO#47/IRON/FA #1/DHA (PNV-DHA PO) Take 1 Tab by mouth daily.       Provider, Historical         Review of Systems     Objective:      Vitals:   There were no vitals filed for this visit.      Physical Exam:  Physical Exam  Cervical Exam: Closed/Thick/High  Membranes: Intact  Fetal Heart Rate: Reactive     Prenatal Labs:         Lab Results   Component Value Date/Time     ABO/Rh(D) A POSITIVE 2019 06:20 AM     Rubella, External immune 2021 12:00 AM     GrBStrep, External Negative 2019 12:00 AM     HBsAg, External negative 2021 12:00 AM     HIV, External non reactive 2021 12:00 AM     RPR, External non reactive 2021 12:00 AM     ABO,Rh A positive 2021 12:00 AM            Impression/Plan:      Active Problems:    Previous  section (2022)           Plan:  Admit for  section. Group B Strep was negative. Discussed the risks of surgery including the risks of bleeding, infection, deep vein thrombosis, and surgical injuries to internal organs including but not limited to the bowels, bladder, rectum, and female reproductive organs.  The patient understands the risks; any and all questions were answered to the patient's satisfaction.                     Patient Demographics    Patient Name   Carrie Smoker   [id number] Legal Sex   Female    1990 Address   [address] [place] [address] Phone   [phone] (Home)   [phone] (Mobile)   CSN:   [id number]     Admit Date: Admit Time Room Bed   2022  7:36  [27] 01 [2141]       Attending Providers    Provider Pager From To   Rob Traylor MD  22     Emergency Contact(s)    Name Relation Home Work Mobile   Indaiatuba Spouse [phone]  Sarbjit Bajulieth [address] [place], [place] [phone]       Patient: Guero Mckenzie  MRN: [id number]  :1990      Keira Deras  MRN: [id number]       Link to Baby  Comment        [de-identified] name MRN Account  Age Sex Admission Type   [facility] [id number] 22 3 days M Confirmed Discharge     OB History       3    Para   3    Term   3            AB        Living   3      SAB        IAB        Ectopic        Molar        Multiple   0    Live Births   3         # Outcome Date GA Labor/2nd Weight Sex Delivery Anes PTL Lv A1 A5   1 Term 16 40w3d  2.85 kg M CS-LTranv Epidural  N Living 8 9   Name: Vinicius Gail   Complications: Fetal Intolerance   Location: Other   Delivering Clinician: Priscilla Khan MD      2 Term 19 40w2d  3.18 kg F CS-LTranv Spinal N Living 9 9   Name: Sherlyn Acotsa   Location: Other   Delivering Clinician: Serafin Bender MD      3 Term 22 39w4d  3.05 kg M CS-LTranv Spinal N Living 9 9   Name: Alejandrina Ruiz   Location: Other   Delivering Clinician: Reddy Rivera MD        Prenatal History     Most Recent Value   Did You Receive Prenatal Care Yes   Name Of OB Provider [facility]   Seen By MFM (Maternal Fetal Medicine)? Yes   Fetal Ultrasound Abnormalities/Concerns? No   Infant Feeding Breast Milk   Circumcision Planned Yes   Pediatrician After Birth/ Follow Up Baby Visits [facility]     Dating Summary    Working JOSE: 22 set by Carmela Wu RN on 21 based on Last Menstrual Period on 21     Based On JOSE GA Diff GA User Date   Last Menstrual Period on 21 Working  Carmela Wu RN 21   Ultrasound on 21 +2d 8w5d Carmela Wu RN 21     Prenatal Results      Prenatal Labs    Test Value Date Time   ABO/Rh * A positive  21    Antibody Scrn * negative  21    Hgb * 12.7  21    Hct * 36.4  21    Platelets * 934  70/15/33    Rubella * immune  21    RPR * non reactive  21    T.  Pallidum Antibody      Urine      Hep B Surf Ag * negative  21    Hep C      HIV * non reactive  21    Gonorrhea      Chlamydia      TSH      GTT, 1 HR (Glucola)      GTT, Fasting      GTT, 1 HR      GTT, 2 HR      GTT, 3 HR        3rd Trimester    Test Value Date Time   Hgb      Hct      Platelets      Group B Strep      Antibody Scrn      TSH      T. Pallidum Antibody      Hep B Surf Ag      Gonorrhea      Chlamydia         Screening/Diagnostics    Test Value Date Time   AFP Only      AFP Tetra      Hgb Electrophoresis      Amniocentesis      Cystic Fibrosis      Thalessemia      Terrell-Sachs      Canavan      PAP Smear      Beta HCG      NT      NIPT      COVID-19        Lab    Test Value Date Time   GTT, Fasting      GTT, 1HR      GTT, 2HR      GTT, 3HR      RPR * non reactive  21    Beta HCG      CMV Ab      Toxoplasma Ab      Varicella Zoster Ab           Legend    *: Historical  View all results for this pregnancy       Herb Grullon [[id number]]       Delivery    Birth date/time: 2022 10:18:40  Delivery type: , Low Transverse   categorization: Repeat   priority: Routine  Indications for : Prior Uterine Surgery  Complications: None    Labor Events     labor?: No   steroids?: None  Antibiotics during labor?: No  Sac identifier: Sac 1  Rupture date/time: 2022 1017  Rupture type: AROM  Fluid color: Clear  Fluid odor: None  Trial of labor?: No  Cervical ripening: None  Induction: None  Augmentation: None  Complications: None    Delivery Providers    Delivering clinician: Fausto Hernandez MD  Provider Role   Fausto Hernandez MD Obstetrician   Corey May, RN Primary Nurse   Carmina Terry RN Primary  Nurse   Rosalva Campbell MD Neonatologist   Stephanie Romano MD Anesthesiologist   Agustina Song, CRNA CRNA   [facility], RT Respiratory Therapist   Reji SpinGo Sindy Hicks, DO Surgeon Assistant     Anesthesia    Method: Spinal    Multiple Birth Onset Second Stage    No data filed    Operative Delivery    Forceps attempted?: No  Vacuum extractor attempted?: No    Presentation    Presentation: Vertex    Apgars    Living status: Living  Apgars:  1 min. :  5 min.:  10 min. :  15 min.:  20 min.:    Skin color:  1  1       Heart rate:  2  2       Reflex irritability:  2  2       Muscle tone:  2  2       Respiratory effort:  2  2       Total:  9  9       Apgars assigned by: MD NATTY    Resuscitation    Method: Suctioning-bulb, Tactile Stimulation    Shoulder Dystocia    Shoulder dystocia present?: No    Measurements    Weight: 3050 g  Weight (lbs): 6 lb 11.6 oz  Length: 50.5 cm  Length (in): 19.88\"  Head circumference: 34 cm  Chest circumference: 31.5 cm    Lacerations    Episiotomy: None    Lacerations: None  Repair Needed: None  # of Repair Packets:   Suture Type and Size:   Suture Comment:   Estimated Blood Loss (mL):       Placenta    Placenta Date/Time: 4/22/2022 1020  Removal: Manual Removal  Appearance: Normal Placenta disposition: Discarded     Vaginal Counts            Delivery Summary History    Event User Date/Time   Signed Stephani Foster RN 4/22/2022 10:28:18

## 2022-06-09 ENCOUNTER — POSTPARTUM VISIT (OUTPATIENT)
Dept: OBGYN CLINIC | Age: 32
End: 2022-06-09

## 2022-06-09 VITALS
HEIGHT: 65 IN | BODY MASS INDEX: 34.39 KG/M2 | SYSTOLIC BLOOD PRESSURE: 104 MMHG | WEIGHT: 206.4 LBS | DIASTOLIC BLOOD PRESSURE: 62 MMHG

## 2022-06-09 PROCEDURE — 99902 PR PRENATAL VISIT: CPT | Performed by: OBSTETRICS & GYNECOLOGY

## 2022-06-09 ASSESSMENT — PATIENT HEALTH QUESTIONNAIRE - PHQ9
SUM OF ALL RESPONSES TO PHQ QUESTIONS 1-9: 0
1. LITTLE INTEREST OR PLEASURE IN DOING THINGS: 0
SUM OF ALL RESPONSES TO PHQ9 QUESTIONS 1 & 2: 0
2. FEELING DOWN, DEPRESSED OR HOPELESS: 0
SUM OF ALL RESPONSES TO PHQ QUESTIONS 1-9: 0

## 2022-06-17 ENCOUNTER — TELEPHONE (OUTPATIENT)
Dept: OBGYN CLINIC | Age: 32
End: 2022-06-17

## 2022-06-17 NOTE — TELEPHONE ENCOUNTER
Pt calls states that she is 8 weeks PP today. She has started bleeding heavy and passing clots, started last night. She states that today she is still bleeding and some blood clots, bleeding is not as heavy. She denies cramping/pain or fever. She is breast feeding and has not noticed any changes in production. She feels that this is not a period as she has not had any PMS changes. She is aware that this could be her period or pp bleeding. Bleeding precautions given, monitor for temp 100.4 or greater or pain unrelieved by ibuprofen/tyelnol or if she continues to pass clots. Monitor bleeding with pad not tampons. Call the office back if she continues to bleed >7 days or prn. Report to Er with above sx. Voiced full understanding. All questions answered.

## 2023-05-30 ENCOUNTER — PATIENT MESSAGE (OUTPATIENT)
Dept: OBGYN CLINIC | Age: 33
End: 2023-05-30

## 2023-05-31 RX ORDER — METRONIDAZOLE 500 MG/1
500 TABLET ORAL 2 TIMES DAILY
Qty: 14 TABLET | Refills: 0 | Status: SHIPPED | OUTPATIENT
Start: 2023-05-31 | End: 2023-06-07

## 2023-05-31 NOTE — TELEPHONE ENCOUNTER
Prescription sent to pharmacy.     Orders Placed This Encounter    metroNIDAZOLE (FLAGYL) 500 MG tablet     Sig: Take 1 tablet by mouth 2 times daily for 7 days     Dispense:  14 tablet     Refill:  0

## 2023-08-22 ENCOUNTER — PATIENT MESSAGE (OUTPATIENT)
Dept: OBGYN CLINIC | Age: 33
End: 2023-08-22

## 2023-08-22 DIAGNOSIS — Z76.89 ENCOUNTER TO ESTABLISH CARE: Primary | ICD-10-CM

## 2023-08-22 NOTE — TELEPHONE ENCOUNTER
Orders Placed This Encounter    External Referral To Plastic Surgery     Referral Priority:   Routine     Referral Type:   Eval and Treat     Referral Reason:   Specialty Services Required     Requested Specialty:   Plastic Surgery     Number of Visits Requested:   1

## 2023-12-12 RX ORDER — ACETAMINOPHEN 500 MG
1000 TABLET ORAL 2 TIMES DAILY
COMMUNITY
Start: 2023-12-14 | End: 2023-12-14

## 2023-12-12 NOTE — PERIOP NOTE
Phone pre-assessment completed. Verified name&  . Order to obtain consent not found in EHR, however patient verifies case posting. Type 2 surgery,  assessment complete. Orders not received. Labs per surgeon: unknown  Labs per anesthesia protocol: hgb 12.6  23 care everywhere      Patient answered medical/surgical history questions at their best of ability. All prior to admission medications documented in EPIC. Patient instructed to take ONLY the following medications the day of surgery according to anesthesia guidelines with a small sip of water: none     If you have never been diagnosed with liver disease, take Acetaminophen 1000mg in the morning and then again before bed one day prior to surgery date. Prescription meds to hold:none    VERBALIZES UNDERSTANDING TO HOLD ALL VITAMINS AND SUPPLEMENTS and NSAIDS (aspirin, naproxen, ibuprofen)IMMEDIATELY PER ANESTHESIA PROTOCOL. Instructed on the following:    > Arrive at [address], [address] (enter at front entrance by statue of [address])   Entrance, time of arrival to be called the day before by 1700  > NPO after midnight including gum, mints, and ice chips  > Responsible adult must drive patient to the hospital, stay during surgery, and patient will need supervision 24 hours after anesthesia  > Use antibacterial soap in shower the night before surgery and on the morning of surgery  > All piercings must be removed prior to arrival.    > Leave all valuables (money and jewelry) at home but bring insurance card and ID on DOS.   > You may be required to pay a deductible or co-pay on the day of your procedure. You can pre-pay by calling [phone] if your surgery is at the [facility] or [phone] if your surgery is at the [facility]. > Do not wear make-up, nail polish, lotions, cologne, perfumes, powders, or oil on skin. Artificial nails are not permitted.    Please bring the following that apply: CPAP or Bi-Pap, inhalers,

## 2023-12-15 ENCOUNTER — ANESTHESIA EVENT (OUTPATIENT)
Dept: SURGERY | Age: 33
End: 2023-12-15
Payer: COMMERCIAL

## 2023-12-15 ENCOUNTER — HOSPITAL ENCOUNTER (OUTPATIENT)
Age: 33
Setting detail: OUTPATIENT SURGERY
Discharge: HOME | End: 2023-12-15
Attending: SURGERY | Admitting: SURGERY
Payer: COMMERCIAL

## 2023-12-15 ENCOUNTER — ANESTHESIA (OUTPATIENT)
Dept: SURGERY | Age: 33
End: 2023-12-15
Payer: COMMERCIAL

## 2023-12-15 VITALS
TEMPERATURE: 98 F | RESPIRATION RATE: 17 BRPM | BODY MASS INDEX: 35.09 KG/M2 | WEIGHT: 210.6 LBS | DIASTOLIC BLOOD PRESSURE: 78 MMHG | OXYGEN SATURATION: 92 % | SYSTOLIC BLOOD PRESSURE: 138 MMHG | HEIGHT: 65 IN | HEART RATE: 109 BPM

## 2023-12-15 DIAGNOSIS — G89.18 POST-OP PAIN: Primary | ICD-10-CM

## 2023-12-15 LAB — HCG UR QL: NEGATIVE

## 2023-12-15 PROCEDURE — 6370000000 HC RX 637 (ALT 250 FOR IP): Performed by: ANESTHESIOLOGY

## 2023-12-15 PROCEDURE — 2580000003 HC RX 258: Performed by: ANESTHESIOLOGY

## 2023-12-15 PROCEDURE — 6360000002 HC RX W HCPCS: Performed by: STUDENT IN AN ORGANIZED HEALTH CARE EDUCATION/TRAINING PROGRAM

## 2023-12-15 PROCEDURE — 2500000003 HC RX 250 WO HCPCS: Performed by: SURGERY

## 2023-12-15 PROCEDURE — 6360000002 HC RX W HCPCS: Performed by: NURSE ANESTHETIST, CERTIFIED REGISTERED

## 2023-12-15 PROCEDURE — 6360000002 HC RX W HCPCS: Performed by: ANESTHESIOLOGY

## 2023-12-15 PROCEDURE — 6360000002 HC RX W HCPCS: Performed by: SURGERY

## 2023-12-15 PROCEDURE — 2500000003 HC RX 250 WO HCPCS: Performed by: NURSE ANESTHETIST, CERTIFIED REGISTERED

## 2023-12-15 PROCEDURE — 2500000003 HC RX 250 WO HCPCS: Performed by: STUDENT IN AN ORGANIZED HEALTH CARE EDUCATION/TRAINING PROGRAM

## 2023-12-15 PROCEDURE — 81025 URINE PREGNANCY TEST: CPT

## 2023-12-15 RX ORDER — MEPERIDINE HYDROCHLORIDE 25 MG/ML
12.5 INJECTION INTRAMUSCULAR; INTRAVENOUS; SUBCUTANEOUS EVERY 5 MIN PRN
Status: DISCONTINUED | OUTPATIENT
Start: 2023-12-15 | End: 2023-12-15 | Stop reason: HOSPADM

## 2023-12-15 RX ORDER — OXYCODONE HYDROCHLORIDE AND ACETAMINOPHEN 5; 325 MG/1; MG/1
1 TABLET ORAL EVERY 6 HOURS PRN
Qty: 28 TABLET | Refills: 0 | Status: SHIPPED | OUTPATIENT
Start: 2023-12-15 | End: 2023-12-22

## 2023-12-15 RX ORDER — EPINEPHRINE 1 MG/ML(1)
AMPUL (ML) INJECTION PRN
Status: DISCONTINUED | OUTPATIENT
Start: 2023-12-15 | End: 2023-12-15 | Stop reason: HOSPADM

## 2023-12-15 RX ORDER — OXYCODONE HYDROCHLORIDE 5 MG/1
5 TABLET ORAL
Status: COMPLETED | OUTPATIENT
Start: 2023-12-15 | End: 2023-12-15

## 2023-12-15 RX ORDER — SODIUM CHLORIDE 0.9 % (FLUSH) 0.9 %
5-40 SYRINGE (ML) INJECTION EVERY 12 HOURS SCHEDULED
Status: DISCONTINUED | OUTPATIENT
Start: 2023-12-15 | End: 2023-12-15 | Stop reason: HOSPADM

## 2023-12-15 RX ORDER — HYDROMORPHONE HYDROCHLORIDE 2 MG/ML
0.5 INJECTION, SOLUTION INTRAMUSCULAR; INTRAVENOUS; SUBCUTANEOUS EVERY 5 MIN PRN
Status: DISCONTINUED | OUTPATIENT
Start: 2023-12-15 | End: 2023-12-15 | Stop reason: HOSPADM

## 2023-12-15 RX ORDER — ACETAMINOPHEN 500 MG
1000 TABLET ORAL ONCE
Status: COMPLETED | OUTPATIENT
Start: 2023-12-15 | End: 2023-12-15

## 2023-12-15 RX ORDER — LIDOCAINE HYDROCHLORIDE 10 MG/ML
INJECTION, SOLUTION INFILTRATION; PERINEURAL PRN
Status: DISCONTINUED | OUTPATIENT
Start: 2023-12-15 | End: 2023-12-15 | Stop reason: HOSPADM

## 2023-12-15 RX ORDER — KETAMINE HYDROCHLORIDE 50 MG/ML
INJECTION, SOLUTION, CONCENTRATE INTRAMUSCULAR; INTRAVENOUS PRN
Status: DISCONTINUED | OUTPATIENT
Start: 2023-12-15 | End: 2023-12-15 | Stop reason: SDUPTHER

## 2023-12-15 RX ORDER — LIDOCAINE HYDROCHLORIDE 20 MG/ML
INJECTION, SOLUTION EPIDURAL; INFILTRATION; INTRACAUDAL; PERINEURAL PRN
Status: DISCONTINUED | OUTPATIENT
Start: 2023-12-15 | End: 2023-12-15 | Stop reason: SDUPTHER

## 2023-12-15 RX ORDER — MIDAZOLAM HYDROCHLORIDE 1 MG/ML
INJECTION INTRAMUSCULAR; INTRAVENOUS PRN
Status: DISCONTINUED | OUTPATIENT
Start: 2023-12-15 | End: 2023-12-15 | Stop reason: SDUPTHER

## 2023-12-15 RX ORDER — GLYCOPYRROLATE 0.2 MG/ML
INJECTION INTRAMUSCULAR; INTRAVENOUS PRN
Status: DISCONTINUED | OUTPATIENT
Start: 2023-12-15 | End: 2023-12-15 | Stop reason: SDUPTHER

## 2023-12-15 RX ORDER — LIDOCAINE HYDROCHLORIDE 10 MG/ML
1 INJECTION, SOLUTION INFILTRATION; PERINEURAL
Status: DISCONTINUED | OUTPATIENT
Start: 2023-12-15 | End: 2023-12-15 | Stop reason: HOSPADM

## 2023-12-15 RX ORDER — ROCURONIUM BROMIDE 10 MG/ML
INJECTION, SOLUTION INTRAVENOUS PRN
Status: DISCONTINUED | OUTPATIENT
Start: 2023-12-15 | End: 2023-12-15 | Stop reason: SDUPTHER

## 2023-12-15 RX ORDER — SODIUM CHLORIDE, SODIUM LACTATE, POTASSIUM CHLORIDE, CALCIUM CHLORIDE 600; 310; 30; 20 MG/100ML; MG/100ML; MG/100ML; MG/100ML
INJECTION, SOLUTION INTRAVENOUS CONTINUOUS
Status: DISCONTINUED | OUTPATIENT
Start: 2023-12-15 | End: 2023-12-15 | Stop reason: HOSPADM

## 2023-12-15 RX ORDER — ONDANSETRON 2 MG/ML
INJECTION INTRAMUSCULAR; INTRAVENOUS PRN
Status: DISCONTINUED | OUTPATIENT
Start: 2023-12-15 | End: 2023-12-15 | Stop reason: SDUPTHER

## 2023-12-15 RX ORDER — SODIUM CHLORIDE 0.9 % (FLUSH) 0.9 %
5-40 SYRINGE (ML) INJECTION PRN
Status: DISCONTINUED | OUTPATIENT
Start: 2023-12-15 | End: 2023-12-15 | Stop reason: HOSPADM

## 2023-12-15 RX ORDER — NEOSTIGMINE METHYLSULFATE 1 MG/ML
INJECTION, SOLUTION INTRAVENOUS PRN
Status: DISCONTINUED | OUTPATIENT
Start: 2023-12-15 | End: 2023-12-15 | Stop reason: SDUPTHER

## 2023-12-15 RX ORDER — MIDAZOLAM HYDROCHLORIDE 1 MG/ML
INJECTION INTRAMUSCULAR; INTRAVENOUS
Status: COMPLETED
Start: 2023-12-15 | End: 2023-12-15

## 2023-12-15 RX ORDER — PROPOFOL 10 MG/ML
INJECTION, EMULSION INTRAVENOUS PRN
Status: DISCONTINUED | OUTPATIENT
Start: 2023-12-15 | End: 2023-12-15 | Stop reason: SDUPTHER

## 2023-12-15 RX ORDER — HYDROMORPHONE HYDROCHLORIDE 2 MG/ML
INJECTION, SOLUTION INTRAMUSCULAR; INTRAVENOUS; SUBCUTANEOUS PRN
Status: DISCONTINUED | OUTPATIENT
Start: 2023-12-15 | End: 2023-12-15 | Stop reason: SDUPTHER

## 2023-12-15 RX ORDER — PROCHLORPERAZINE EDISYLATE 5 MG/ML
5 INJECTION INTRAMUSCULAR; INTRAVENOUS
Status: COMPLETED | OUTPATIENT
Start: 2023-12-15 | End: 2023-12-15

## 2023-12-15 RX ORDER — ONDANSETRON 2 MG/ML
4 INJECTION INTRAMUSCULAR; INTRAVENOUS
Status: COMPLETED | OUTPATIENT
Start: 2023-12-15 | End: 2023-12-15

## 2023-12-15 RX ORDER — SODIUM CHLORIDE 9 MG/ML
INJECTION, SOLUTION INTRAVENOUS PRN
Status: DISCONTINUED | OUTPATIENT
Start: 2023-12-15 | End: 2023-12-15 | Stop reason: HOSPADM

## 2023-12-15 RX ORDER — DEXAMETHASONE SODIUM PHOSPHATE 4 MG/ML
INJECTION, SOLUTION INTRA-ARTICULAR; INTRALESIONAL; INTRAMUSCULAR; INTRAVENOUS; SOFT TISSUE PRN
Status: DISCONTINUED | OUTPATIENT
Start: 2023-12-15 | End: 2023-12-15 | Stop reason: SDUPTHER

## 2023-12-15 RX ADMIN — PROMETHAZINE HYDROCHLORIDE 6.25 MG: 25 INJECTION INTRAMUSCULAR; INTRAVENOUS at 18:46

## 2023-12-15 RX ADMIN — ONDANSETRON 4 MG: 2 INJECTION INTRAMUSCULAR; INTRAVENOUS at 17:02

## 2023-12-15 RX ADMIN — KETAMINE HYDROCHLORIDE 30 MG: 50 INJECTION, SOLUTION INTRAMUSCULAR; INTRAVENOUS at 13:59

## 2023-12-15 RX ADMIN — ONDANSETRON 4 MG: 2 INJECTION INTRAMUSCULAR; INTRAVENOUS at 15:50

## 2023-12-15 RX ADMIN — SODIUM CHLORIDE, SODIUM LACTATE, POTASSIUM CHLORIDE, AND CALCIUM CHLORIDE: 600; 310; 30; 20 INJECTION, SOLUTION INTRAVENOUS at 15:10

## 2023-12-15 RX ADMIN — HYDROMORPHONE HYDROCHLORIDE 0.5 MG: 2 INJECTION, SOLUTION INTRAMUSCULAR; INTRAVENOUS; SUBCUTANEOUS at 16:33

## 2023-12-15 RX ADMIN — MIDAZOLAM 2 MG: 1 INJECTION INTRAMUSCULAR; INTRAVENOUS at 13:36

## 2023-12-15 RX ADMIN — LIDOCAINE HYDROCHLORIDE 50 MG: 20 INJECTION, SOLUTION EPIDURAL; INFILTRATION; INTRACAUDAL; PERINEURAL at 13:55

## 2023-12-15 RX ADMIN — HYDROMORPHONE HYDROCHLORIDE 0.4 MG: 2 INJECTION INTRAMUSCULAR; INTRAVENOUS; SUBCUTANEOUS at 15:57

## 2023-12-15 RX ADMIN — KETAMINE HYDROCHLORIDE 10 MG: 50 INJECTION, SOLUTION INTRAMUSCULAR; INTRAVENOUS at 15:43

## 2023-12-15 RX ADMIN — HYDROMORPHONE HYDROCHLORIDE 0.2 MG: 2 INJECTION INTRAMUSCULAR; INTRAVENOUS; SUBCUTANEOUS at 16:14

## 2023-12-15 RX ADMIN — HYDROMORPHONE HYDROCHLORIDE 0.4 MG: 2 INJECTION INTRAMUSCULAR; INTRAVENOUS; SUBCUTANEOUS at 15:14

## 2023-12-15 RX ADMIN — OXYCODONE HYDROCHLORIDE 5 MG: 5 TABLET ORAL at 17:28

## 2023-12-15 RX ADMIN — ACETAMINOPHEN 1000 MG: 500 TABLET, FILM COATED ORAL at 12:16

## 2023-12-15 RX ADMIN — DEXAMETHASONE SODIUM PHOSPHATE 10 MG: 4 INJECTION, SOLUTION INTRAMUSCULAR; INTRAVENOUS at 14:02

## 2023-12-15 RX ADMIN — HYDROMORPHONE HYDROCHLORIDE 0.2 MG: 2 INJECTION INTRAMUSCULAR; INTRAVENOUS; SUBCUTANEOUS at 14:12

## 2023-12-15 RX ADMIN — HYDROMORPHONE HYDROCHLORIDE 0.6 MG: 2 INJECTION INTRAMUSCULAR; INTRAVENOUS; SUBCUTANEOUS at 13:46

## 2023-12-15 RX ADMIN — SODIUM CHLORIDE, SODIUM LACTATE, POTASSIUM CHLORIDE, AND CALCIUM CHLORIDE: 600; 310; 30; 20 INJECTION, SOLUTION INTRAVENOUS at 12:16

## 2023-12-15 RX ADMIN — KETAMINE HYDROCHLORIDE 10 MG: 50 INJECTION, SOLUTION INTRAMUSCULAR; INTRAVENOUS at 14:31

## 2023-12-15 RX ADMIN — KETAMINE HYDROCHLORIDE 10 MG: 50 INJECTION, SOLUTION INTRAMUSCULAR; INTRAVENOUS at 15:08

## 2023-12-15 RX ADMIN — ROCURONIUM BROMIDE 50 MG: 50 INJECTION, SOLUTION INTRAVENOUS at 13:55

## 2023-12-15 RX ADMIN — Medication 4 MG: at 16:01

## 2023-12-15 RX ADMIN — Medication 2 G: at 14:00

## 2023-12-15 RX ADMIN — PROPOFOL 200 MG: 10 INJECTION, EMULSION INTRAVENOUS at 13:55

## 2023-12-15 RX ADMIN — HYDROMORPHONE HYDROCHLORIDE 0.2 MG: 2 INJECTION INTRAMUSCULAR; INTRAVENOUS; SUBCUTANEOUS at 14:31

## 2023-12-15 RX ADMIN — GLYCOPYRROLATE 0.6 MG: 0.2 INJECTION INTRAMUSCULAR; INTRAVENOUS at 16:01

## 2023-12-15 RX ADMIN — PROCHLORPERAZINE EDISYLATE 5 MG: 5 INJECTION INTRAMUSCULAR; INTRAVENOUS at 16:47

## 2023-12-15 ASSESSMENT — PAIN SCALES - GENERAL: PAINLEVEL_OUTOF10: 8

## 2023-12-15 ASSESSMENT — PAIN DESCRIPTION - LOCATION: LOCATION: BREAST

## 2023-12-15 ASSESSMENT — PAIN - FUNCTIONAL ASSESSMENT: PAIN_FUNCTIONAL_ASSESSMENT: 0-10

## 2023-12-15 NOTE — ANESTHESIA PROCEDURE NOTES
Airway  Date/Time: 12/15/2023 1:58 PM  Urgency: elective    Airway not difficult    General Information and Staff    Patient location during procedure: OR  Resident/CRNA: JEAN MARIE Garner CRNA  Performed: resident/CRNA   Performed by: JEAN MARIE Garner CRNA  Authorized by: Ny Boyce MD      Indications and Patient Condition  Indications for airway management: anesthesia  Spontaneous Ventilation: absent  Sedation level: deep  Preoxygenated: yes  Patient position: sniffing  MILS not maintained throughout  Mask difficulty assessment: vent by bag mask    Final Airway Details  Final airway type: endotracheal airway      Successful airway: ETT  Cuffed: yes   Successful intubation technique: direct laryngoscopy  Facilitating devices/methods: intubating stylet  Endotracheal tube insertion site: oral  Blade: Thanh  Blade size: #3  ETT size (mm): 7.0  Cormack-Lehane Classification: grade I - full view of glottis  Placement verified by: chest auscultation and capnometry   Measured from: lips  ETT to lips (cm): 22  Number of attempts at approach: 1  Ventilation between attempts: bag mask    Additional Comments  Atraumatic insertion, lips and teeth as preeval

## 2023-12-15 NOTE — H&P
CC: Macromastia    HPI: 27 yo history of large breast size and associated back and neck pain presents for reduction. Past Medical History:   Diagnosis Date    Acute cholecystitis     Bowel trouble     Cholecystitis, unspecified 4/10/2019    Gallbladder calculus without cholecystitis 6/15/2016    Gallstones     GERD (gastroesophageal reflux disease)     during pregnancy, resolved after delivery    Hypertrophy of breast     Malignant melanoma of left lower extremity including hip ([address]) 2017    Overview:  Added automatically from request for surgery [id number]    Maternal arrhythmia complicating pregnancy in second trimester     Melanoma ([address])     left hip- surgery with lymph node removal        Past Surgical History:   Procedure Laterality Date     SECTION      x3    CHOLECYSTECTOMY      LYMPH NODE DISSECTION      groin with melanoma removal    OTHER SURGICAL HISTORY      melanoma removed left hip    WISDOM TOOTH EXTRACTION         Vitals:    12/15/23 1149   BP:    Pulse: 78   Resp:    Temp:    SpO2:      A&O x3  RRR  Resp clear  Wise pattern reduction marked    A/P:  Will proceed with bilateral reduction. Risks, benefits and alternatives discussed. Consent affirmed.

## 2023-12-15 NOTE — DISCHARGE INSTRUCTIONS
Keep dressings in place and dry until return to office. Avoid lifting arms above shoulder level. Avoid lifting more than 5 lbs or any strenuous activity     MEDICATION INTERACTION:    During your procedure you potentially received a medication or medications which may reduce the effectiveness of oral contraceptives. Please consider other forms of contraception for 1 month following your procedure if you are currently using oral contraceptives as your primary form of birth control. In addition to this, we recommend continuing your oral contraceptive as prescribed, unless otherwise instructed by your physician, during this time. ACTIVITY  As tolerated and as directed by your doctor. You may shower in 24 hours. Do not take a bath until cleared by MD.     DIET  Clear liquids until no nausea or vomiting, then light diet for the first day. Advance to regular diet on second day, unless your doctor orders otherwise. If nausea and vomiting continues, call your doctor. PAIN  Take pain medication as directed by your doctor. Call your doctor if pain is NOT relieved by medication. CALL YOUR DOCTOR IF   Excessive bleeding that does not stop after holding pressure over the area. Temperature of 101 degrees F or above. Excessive redness, swelling or bruising, and/or green or yellow, smelly discharge from incision. After general anesthesia or intravenous sedation, for 24 hours or while taking prescription Narcotics:  Limit your activities  Some people will feel drowsy or dizzy for up to a few hours after waking up. A responsible adult needs to be with you for the next 24 hours  Do not drive and operate hazardous machinery  Do not make important personal or business decisions  Do not drink alcoholic beverages  If you have not urinated within 8 hours after discharge, and you are experiencing discomfort from urinary retention, please go to the nearest ED.   If you have sleep apnea and have a CPAP machine,

## 2023-12-18 NOTE — OP NOTE
Operative Note      Patient: Jeannie Daigle  YOB: 1990  MRN: [id number]    Date of Procedure: 12/15/2023    Pre-Op Diagnosis Codes:     * Hypertrophy of breast [N62]    Post-Op Diagnosis: Same       Procedure(s):  BILATERAL BREAST MAMMOPLASTY REDUCTION    Surgeon(s):  Velia Denis MD    Assistant:   * No surgical staff found *    Anesthesia: General    Estimated Blood Loss (mL): 50 ml    Complications: None immediate    Specimens:   ID Type Source Tests Collected by Time Destination   A : Right breast Tissue Breast SURGICAL PATHOLOGY Velia Denis MD 12/15/2023 1445    B : Left breast Tissue Breast SURGICAL PATHOLOGY Jana Pavon MD 12/15/2023 1530        Implants:  * No implants in log *      Drains: * No LDAs found *      Detailed Description of Procedure:     Prior to procedure informed consent obtained from patient follow discussion of risks, benefits and alternatives including but not limited to asymmetry, deformity, pain, need for further surgery, infection, bleeding, tissue loss. Discussed loss of NAC function, sensation or tissue. Wise pattern reduction marked in holding with patient in the upright position marking the midline, breast meridian, lateral and medial breast margins and new NAC position at the at the level of the IMF. Patient was brought to the OR. General anesthesia induced. Surgical timeout performed. Previous marks reinforced. 100 ml of 0.25% Lidocaine with epi infiltrated on the right. Attention first turned to the right breast. All of the marked incisions were scored and the pedicle de-epithelialized. Incisions then made full thickness. Central mound inferior pedicle developed sharply to the chest wall. Lateral and then medial flap thinned sharply. Keyhole then sharply excised. Specimen passed off the field. Pedicle then trimmed taking care to maintain lateral chest wall tissue as well as NAC vascularity.  Wound irrigated and hemostasis obtained with

## 2024-10-17 ENCOUNTER — OFFICE VISIT (OUTPATIENT)
Dept: OBGYN CLINIC | Age: 34
End: 2024-10-17
Payer: COMMERCIAL

## 2024-10-17 VITALS
DIASTOLIC BLOOD PRESSURE: 74 MMHG | BODY MASS INDEX: 34.75 KG/M2 | WEIGHT: 208.6 LBS | HEIGHT: 65 IN | SYSTOLIC BLOOD PRESSURE: 114 MMHG

## 2024-10-17 DIAGNOSIS — Z11.51 SPECIAL SCREENING EXAMINATION FOR HUMAN PAPILLOMAVIRUS (HPV): ICD-10-CM

## 2024-10-17 DIAGNOSIS — N89.8 VAGINAL ODOR: ICD-10-CM

## 2024-10-17 DIAGNOSIS — N90.89 VULVAR LESION: ICD-10-CM

## 2024-10-17 DIAGNOSIS — N92.6 IRREGULAR MENSES: Primary | ICD-10-CM

## 2024-10-17 DIAGNOSIS — Z13.89 SCREENING FOR GENITOURINARY CONDITION: ICD-10-CM

## 2024-10-17 LAB
BILIRUBIN, URINE, POC: NEGATIVE
BLOOD URINE, POC: NORMAL
ESTRADIOL SERPL-MCNC: 45.9 PG/ML
FSH SERPL-ACNC: 8.1 MIU/ML
GLUCOSE URINE, POC: NEGATIVE
KETONES, URINE, POC: NEGATIVE
LEUKOCYTE ESTERASE, URINE, POC: NEGATIVE
LH SERPL-ACNC: 6.2 MIU/ML
NITRITE, URINE, POC: NEGATIVE
PH, URINE, POC: 6 (ref 4.6–8)
PROGEST SERPL-MCNC: 0.47 NG/ML
PROTEIN,URINE, POC: NEGATIVE
SPECIFIC GRAVITY, URINE, POC: 1.02 (ref 1–1.03)
T4 FREE SERPL-MCNC: 1 NG/DL (ref 0.9–1.7)
TSH, 3RD GENERATION: 0.93 UIU/ML (ref 0.27–4.2)
URINALYSIS CLARITY, POC: CLEAR
URINALYSIS COLOR, POC: YELLOW
UROBILINOGEN, POC: NORMAL MG/DL

## 2024-10-17 PROCEDURE — 81002 URINALYSIS NONAUTO W/O SCOPE: CPT | Performed by: OBSTETRICS & GYNECOLOGY

## 2024-10-17 PROCEDURE — 99214 OFFICE O/P EST MOD 30 MIN: CPT | Performed by: OBSTETRICS & GYNECOLOGY

## 2024-10-17 RX ORDER — TINIDAZOLE 500 MG/1
2 TABLET ORAL
Qty: 8 TABLET | Refills: 0 | Status: SHIPPED | OUTPATIENT
Start: 2024-10-17 | End: 2024-10-19

## 2024-10-17 ASSESSMENT — PATIENT HEALTH QUESTIONNAIRE - PHQ9
SUM OF ALL RESPONSES TO PHQ QUESTIONS 1-9: 0
1. LITTLE INTEREST OR PLEASURE IN DOING THINGS: NOT AT ALL
SUM OF ALL RESPONSES TO PHQ9 QUESTIONS 1 & 2: 0
2. FEELING DOWN, DEPRESSED OR HOPELESS: NOT AT ALL

## 2024-10-17 NOTE — PROGRESS NOTES
10/17/2024    Jana Mccain  1990  Age: 33 y.o.    Patient's last menstrual period was 10/17/2024.      PCP: Rickie Barnes Jr., MD  Patient does see them for regular preventative visits.      HPI: Last pap 10/12/21 wnl, hpv neg, has area of itching on perineum only on left side present for a few months, was given rx for yeast by derm but this did not help, odor associated, she also c/o spotting for several days before period, then period will be delayed for several days. This has been going on x6-8 months. Had breast reduction in dec 2023 and has noticed lump under left breast, concerned about fat necrosis from scar tissue. This has happened twice since surgery, only on left side and under breast.        Failed to redirect to the Timeline version of the [employer] SmartLink.    Allergies, medications, past medical and surgical history, social history, family history all reviewed.       Review of Systems  Constitutional:  Denies unexplained weight loss or heat or cold intolerance  ENT: Denies change in vision, change in hearing, frequent headaches  Cardiovascular:  Denies chest pain, swelling in legs or feet, shortness of breath when lying flat  Respiratory:  Denies shortness of breath, cough greater than 2 weeks or coughing up blood  Gastro: Denies diarrhea greater than 2 weeks, rectal bleeding, bloody stools, heartburn, or constipation  :  Denies blood in urine, nocturia, dysuria or incontinence  Breast:  Denies nipple discharge, masses or pain  Skin:  Denies rash greater than 2 weeks, change in moles  Musculoskeletal/Neuro:  Denies joint pain, muscle weakness, seizures, loss of balance or frequent falls  Psych:  Denies frequent crying spells or severe anxiety  Heme:  Denies easy bruising, bleeding gums, frequent nosebleeds or swollen lymph nodes  GYN:  Denies bleeding or spotting between menses, heavy menses, menses longer than 7 days, pain with sex, severe menstrual cramps.    Vitals:    10/17/24

## 2024-10-21 LAB
HSV1 DNA SPEC QL NAA+PROBE: NEGATIVE
HSV2 DNA SPEC QL NAA+PROBE: NEGATIVE
SPECIMEN SOURCE: NORMAL

## 2024-10-22 RX ORDER — CLOBETASOL PROPIONATE 0.5 MG/G
OINTMENT TOPICAL
Qty: 60 G | Refills: 0 | Status: SHIPPED | OUTPATIENT
Start: 2024-10-22

## 2024-10-24 LAB
COLLECTION METHOD: NORMAL
CYTOLOGIST CVX/VAG CYTO: NORMAL
CYTOLOGY CVX/VAG DOC THIN PREP: NORMAL
DATE OF LMP: NORMAL
HPV APTIMA: NEGATIVE
Lab: NORMAL
Lab: NORMAL
PAP SOURCE: NORMAL
PATH REPORT.FINAL DX SPEC: NORMAL
STAT OF ADQ CVX/VAG CYTO-IMP: NORMAL

## 2024-11-12 ENCOUNTER — PROCEDURE VISIT (OUTPATIENT)
Dept: OBGYN CLINIC | Age: 34
End: 2024-11-12
Payer: COMMERCIAL

## 2024-11-12 ENCOUNTER — OFFICE VISIT (OUTPATIENT)
Dept: OBGYN CLINIC | Age: 34
End: 2024-11-12
Payer: COMMERCIAL

## 2024-11-12 VITALS
DIASTOLIC BLOOD PRESSURE: 76 MMHG | SYSTOLIC BLOOD PRESSURE: 118 MMHG | BODY MASS INDEX: 33.92 KG/M2 | WEIGHT: 203.6 LBS | HEIGHT: 65 IN

## 2024-11-12 DIAGNOSIS — N92.6 IRREGULAR MENSES: Primary | ICD-10-CM

## 2024-11-12 PROCEDURE — 99214 OFFICE O/P EST MOD 30 MIN: CPT | Performed by: OBSTETRICS & GYNECOLOGY

## 2024-11-12 PROCEDURE — 76830 TRANSVAGINAL US NON-OB: CPT | Performed by: OBSTETRICS & GYNECOLOGY

## 2024-11-12 RX ORDER — METRONIDAZOLE 7.5 MG/G
1 GEL VAGINAL WEEKLY
Qty: 70 G | Refills: 5 | Status: SHIPPED | OUTPATIENT
Start: 2024-11-12 | End: 2025-01-29

## 2024-11-12 NOTE — PROGRESS NOTES
The patient returns to the office today after pelvic ultrasound was ordered to evaluate her irregular bleeding around her periods.  She states that she was having spotting for three or four days prior to the onset of her menstrual flow and then, lingering flow afterwards for several days which was greater over the last few months.    Pelvic ultrasound reveals slightly enlarged, anteverted, inhomogeneous uterus with the appearance of possible adenomyosis.  Uterine dimensions were 10.9 x 4.9 x 5.5 cm.  There were no intracavitary masses visualized.  The endometrium was 7.8 mm consistent with midcycle thickness.  Both ovaries appeared quite normal with follicular activity.  Both adnexa appeared quite normal with no free fluid apparent throughout the pelvis.    With normal pelvic ultrasound, the patient is counseled that the suspected source of her irregular bleeding is likely to be just ovarian inefficiency at this point in regulating her cycle.  The adenomyosis may be playing a part but generally she states that she does not have significant pain but really just irregular bleeding which was frustrating.  She is counseled regarding options for management with respect to either something daily in the form of birth control pill that would regulate her cycle in a very precise way versus an intrauterine system which would very likely simply preclude her from having any further menstrual bleeding at all.  She ultimately inquires about holistic options such as hormonal supplements that are available over the counter.    Lengthy discussion regarding options as well as specific products that were potential options to alleviate her irregular spotting are outlined for her.  She is counseled regarding more defined products as a good source of reproducible plant-based hormones.  She states that she will likely try this initially.  She is relieved that there is no organic problem that requires action based on ultrasound findings.

## 2025-08-11 ENCOUNTER — TELEPHONE (OUTPATIENT)
Dept: OBGYN CLINIC | Age: 35
End: 2025-08-11

## 2025-08-11 RX ORDER — FLUCONAZOLE 150 MG/1
TABLET ORAL
Qty: 3 TABLET | Refills: 0 | Status: SHIPPED | OUTPATIENT
Start: 2025-08-11

## (undated) DEVICE — ENDOLOOP SUT LIGATURE 18IN -- 12/BX PDS II

## (undated) DEVICE — (D)PREP SKN CHLRAPRP APPL 26ML -- CONVERT TO ITEM 371833

## (undated) DEVICE — [HIGH FLOW INSUFFLATOR,  DO NOT USE IF PACKAGE IS DAMAGED,  KEEP DRY,  KEEP AWAY FROM SUNLIGHT,  PROTECT FROM HEAT AND RADIOACTIVE SOURCES.]: Brand: PNEUMOSURE

## (undated) DEVICE — LOGICUT SCISSOR LENGTH 320MM: Brand: LOGI - LAPAROSCOPIC INSTRUMENT SYSTEM

## (undated) DEVICE — UNIVERSAL FIXATION CANNULA: Brand: VERSAONE

## (undated) DEVICE — SURGICAL PROCEDURE PACK C SECT CDS

## (undated) DEVICE — KENDALL SCD EXPRESS SLEEVES, KNEE LENGTH, MEDIUM: Brand: KENDALL SCD

## (undated) DEVICE — STERILE POLYISOPRENE POWDER-FREE SURGICAL GLOVES: Brand: PROTEXIS

## (undated) DEVICE — PENCIL ES L3M BTTN SWCH S STL HEX LOK BLDE ELECTRD HOLSTER

## (undated) DEVICE — AMD ANTIMICROBIAL GAUZE SPONGES,12 PLY USP TYPE VII, 0.2% POLYHEXAMETHYLENE BIGUANIDE HCI (PHMB): Brand: CURITY

## (undated) DEVICE — PREP SKN DURAPREP 26ML APPL --

## (undated) DEVICE — MEDI-VAC NON-CONDUCTIVE SUCTION TUBING: Brand: CARDINAL HEALTH

## (undated) DEVICE — SUTURE SZ 0 27IN 5/8 CIR UR-6  TAPER PT VIOLET ABSRB VICRYL J603H

## (undated) DEVICE — LAP CHOLE: Brand: MEDLINE INDUSTRIES, INC.

## (undated) DEVICE — SUTURE VCRL SZ 0 L36IN ABSRB UD CTX-B 1/2 CIR BLNT PNT NDL JB978

## (undated) DEVICE — SOLUTION IV 1000ML 0.9% SOD CHL

## (undated) DEVICE — CONTAINER SPEC FRMLN 120ML --

## (undated) DEVICE — AMD ANTIMICROBIAL NON-ADHERENT PAD,0.2% POLYHEXAMETHYLENE BIGUANIDE HCI (PHMB): Brand: TELFA

## (undated) DEVICE — TROCARS: Brand: KII® BLUNT TIP ACCESS SYSTEM

## (undated) DEVICE — SUT CHRMC 1 36IN CTX BRN --

## (undated) DEVICE — SUTURE MCRYL SZ 4-0 L27IN ABSRB UD L19MM PS-2 1/2 CIR PRIM Y426H

## (undated) DEVICE — APPLICATOR BNDG 1MM ADH PREMIERPRO EXOFIN

## (undated) DEVICE — SUTURE VCRL SZ 1 L27IN ABSRB UD CT-1 L36MM 1/2 CIR J261H

## (undated) DEVICE — SUTURE VCRL 3-0 L36IN ABSRB UD CTB-1 L36MM 1/2 CIR NDL JB944

## (undated) DEVICE — CATH FOL TY IC BAG 16FR 2000ML -- CONVERT TO ITEM 363158

## (undated) DEVICE — REM POLYHESIVE ADULT PATIENT RETURN ELECTRODE: Brand: VALLEYLAB

## (undated) DEVICE — Device: Brand: PORTEX

## (undated) DEVICE — SOL ANTI-FOG 6ML MEDC -- MEDICHOICE - CONVERT TO 358427

## (undated) DEVICE — SOLUTION IRRIG 1000ML H2O STRL BLT

## (undated) DEVICE — CLIP APPLIER WITH CLIP LOGIC TECHNOLOGY: Brand: ENDO CLIP III

## (undated) DEVICE — BUTTON SWITCH PENCIL BLADE ELECTRODE, HOLSTER: Brand: EDGE

## (undated) DEVICE — NEEDLE HYPO 21GA L1.5IN INTRAMUSCULAR S STL LATCH BVL UP

## (undated) DEVICE — SUTURE VCRL SZ 4-0 L27IN ABSRB UD L60MM KS STR REV CUT NDL J662H

## (undated) DEVICE — BLADELESS OPTICAL TROCAR WITH FIXATION CANNULA: Brand: VERSAPORT

## (undated) DEVICE — TRAY CATH 16FR PVC INTMIT STR TIP PREATTACH TO 1000ML COLL